# Patient Record
Sex: FEMALE | Race: WHITE | Employment: OTHER | URBAN - METROPOLITAN AREA
[De-identification: names, ages, dates, MRNs, and addresses within clinical notes are randomized per-mention and may not be internally consistent; named-entity substitution may affect disease eponyms.]

---

## 2017-08-22 ENCOUNTER — OFFICE VISIT (OUTPATIENT)
Dept: ENT CLINIC | Age: 77
End: 2017-08-22

## 2017-08-22 VITALS
BODY MASS INDEX: 27.55 KG/M2 | DIASTOLIC BLOOD PRESSURE: 70 MMHG | HEIGHT: 66 IN | WEIGHT: 171.4 LBS | SYSTOLIC BLOOD PRESSURE: 104 MMHG | HEART RATE: 66 BPM

## 2017-08-22 DIAGNOSIS — R13.12 OROPHARYNGEAL DYSPHAGIA: Primary | ICD-10-CM

## 2017-08-22 PROCEDURE — 1036F TOBACCO NON-USER: CPT | Performed by: OTOLARYNGOLOGY

## 2017-08-22 PROCEDURE — 99214 OFFICE O/P EST MOD 30 MIN: CPT | Performed by: OTOLARYNGOLOGY

## 2017-08-22 PROCEDURE — G8419 CALC BMI OUT NRM PARAM NOF/U: HCPCS | Performed by: OTOLARYNGOLOGY

## 2017-08-22 PROCEDURE — 1123F ACP DISCUSS/DSCN MKR DOCD: CPT | Performed by: OTOLARYNGOLOGY

## 2017-08-22 PROCEDURE — 4040F PNEUMOC VAC/ADMIN/RCVD: CPT | Performed by: OTOLARYNGOLOGY

## 2017-08-22 PROCEDURE — G8399 PT W/DXA RESULTS DOCUMENT: HCPCS | Performed by: OTOLARYNGOLOGY

## 2017-08-22 PROCEDURE — G8427 DOCREV CUR MEDS BY ELIG CLIN: HCPCS | Performed by: OTOLARYNGOLOGY

## 2017-08-22 PROCEDURE — 1090F PRES/ABSN URINE INCON ASSESS: CPT | Performed by: OTOLARYNGOLOGY

## 2017-10-13 ENCOUNTER — HOSPITAL ENCOUNTER (OUTPATIENT)
Dept: MAMMOGRAPHY | Age: 77
Discharge: HOME | End: 2017-10-13
Attending: OBSTETRICS & GYNECOLOGY | Admitting: OBSTETRICS & GYNECOLOGY

## 2017-10-13 DIAGNOSIS — Z12.31 VISIT FOR SCREENING MAMMOGRAM: ICD-10-CM

## 2017-10-13 DIAGNOSIS — R13.12 DYSPHAGIA, OROPHARYNGEAL PHASE: ICD-10-CM

## 2017-10-23 ENCOUNTER — HOSPITAL ENCOUNTER (OUTPATIENT)
Dept: SPEECH THERAPY | Age: 77
Discharge: HOME | End: 2017-10-23

## 2017-10-23 DIAGNOSIS — R13.12 OROPHARYNGEAL DYSPHAGIA: ICD-10-CM

## 2017-10-23 DIAGNOSIS — R13.12 DYSPHAGIA, OROPHARYNGEAL PHASE: ICD-10-CM

## 2017-10-23 NOTE — PROCEDURES
limits    Pharyngeal Phase  -Timely swallow initiation  -Effortful bolus transfer  -Delayed UES opening resulting in delayed pharyngeal clearing  -Consistent flash laryngeal penetration with thin liquids, this was self-clearing  -No aspiration was viewed with any texture    Esophageal Phase  Unremarkable    Following Evaluation:  Results/recommendations and education given to Patient who verbalized understanding    Timed Code Treatment: 0 minutes    Total Treatment Time: 40 minutes    Antoine Del Rosario MA, [id number] [facility]    Speech-Language Pathologist

## 2017-12-13 ENCOUNTER — OFFICE VISIT (OUTPATIENT)
Dept: ENT CLINIC | Age: 77
End: 2017-12-13

## 2017-12-13 VITALS
WEIGHT: 178.2 LBS | SYSTOLIC BLOOD PRESSURE: 117 MMHG | HEIGHT: 66 IN | HEART RATE: 68 BPM | DIASTOLIC BLOOD PRESSURE: 77 MMHG | BODY MASS INDEX: 28.64 KG/M2

## 2017-12-13 DIAGNOSIS — R13.12 OROPHARYNGEAL DYSPHAGIA: Primary | ICD-10-CM

## 2017-12-13 PROCEDURE — 31575 DIAGNOSTIC LARYNGOSCOPY: CPT | Performed by: OTOLARYNGOLOGY

## 2017-12-13 RX ORDER — ROSUVASTATIN CALCIUM 5 MG/1
5 TABLET, COATED ORAL
COMMUNITY

## 2017-12-13 NOTE — PROGRESS NOTES
Chief Complaint   Patient presents with    Dysphagia       PROCEDURE:  FLEXIBLE FIBEROPTIC NASOPHARYNGOLARYNGOSCOPY  INDICATION:  Inadequate visualization by indirect laryngoscopy mirror examination and need for detailed examination of the larynx and pharynx to evaluate dysphagia. INFORMED CONSENT:  The patient was advised of the medical necessity for this procedure, which was described. The attendant risks and potential complications were discussed, including, but not limited to bleeding, infection, adverse reaction to medications, hoarseness, sore throat, inability to obtain adequate visualization, and future need for rigid operative endoscopy. The expected outcome, potential benefits and the alternatives of therapy were discussed. David Chung asked appropriate questions and then expressed the lack of any further questions, understanding, acceptance, and the desire to undergo with this procedure, granting verbal informed consent. FINDINGS:  There was diffuse mucosal erythema and edema in the nasal cavity and nasopharynx consistent with viral URI. Otherwise there was a normal endoscopic appearance of the upper aerodigestive tract. The vocal cords appeared to be normal, with no nodule, ulceration, polyp, leukoplakia or other lesions. The vocal cords appeared to be normally mobile bilaterally with midline approximation on phonation. Sensation of the hypopharynx and larynx appeared to be normal when touched by the end of the flexible scope. The nasopharynx, eustachian tube orifices and fossa of Rosenmüller, oropharynx, base of tongue, hypopharynx, supraglottis, subglottis, and piriform sinuses all appeared to be normal, with no lesions. Visualization was excellent throughout the examination. I examined ears at hear request.  There was a cerumen accumulation in the right EAC. The visible portion of the left TM appeard to be normal.  The left EAC was clear.   The left TM appeared to be normal. (MBS)  Diet Prior to Study: Regular texture diet with thin liquids  Pain Level: Pt did not report pain     Impression:  Modified Barium Swallow evaluation completed on 10/23/2017. Results indicate mild oropharyngeal dysphagia characterized by effortful bolus transfer, delayed UES opening resulting in delayed pharyngeal clearing, and consistent flash laryngeal penetration with thin liquids. Thin liquids via cup and straw revealed timely swallow initiation with delayed UES opening resulting in consistent flash laryngeal penetration during the swallow. This appeared to be self-clearing with no stain noted on underside of epiglottis. No aspiration was viewed with any texture. Pt was noted to have effortful bolus transfer with physical effort observed when transitioning the bolus. Adequate mastication and oral clearance was observed with solid textures. At this time Pt is at mild risk for aspiration of thin liquids due to delayed pharyngeal clearing/UES opening, therefore precautions should be followed.     Aspiration/Penetration Risk:  Mild risk with thin liquids     Recommendations:    Diet Level: Regular texture diet with thin liquids, meds as tolerated  Referral: Consider GI consult due to Pt reported history of reflux  Strategies: Upright 90 degrees at meals; Small bites/sips, GERD precautions  Treatments: Speech Therapy for dysphagia treatment  Goals:  1)Pt will tolerate diet with no signs or symptoms of aspiration   2)Additional goals per treating SLP        IMPRESSION / Beny Mcguire / Panfilo Aguirre / Johnathan Quintanilla was seen today for dysphagia. Diagnoses and all orders for this visit:    Oropharyngeal dysphagia        RECOMMENDATIONS / PLAN    1. Dysphagia therapy. Patient declined, but will consider if her self help measures are not successful. 2. Follow speech therapy suggestion. Patient declined, but will consider if her self help measures are not successful. 3. Exercise GERD precautions.   Patient stated she knows all about these and will do them along with her self help measures. 4. She agreed to call if her symptoms get worse or if she has hearing loss or heaviness or fullness in her ears for possible ear cleaning. 5. Return for any further Ear, Nose, Throat, or Sinus problems.

## 2018-05-07 ENCOUNTER — OFFICE VISIT (OUTPATIENT)
Dept: ENT CLINIC | Age: 78
End: 2018-05-07

## 2018-05-07 VITALS — HEART RATE: 69 BPM | DIASTOLIC BLOOD PRESSURE: 64 MMHG | SYSTOLIC BLOOD PRESSURE: 96 MMHG

## 2018-05-07 DIAGNOSIS — H90.0 CONDUCTIVE HEARING LOSS OF BOTH EARS: ICD-10-CM

## 2018-05-07 DIAGNOSIS — H61.23 BILATERAL IMPACTED CERUMEN: Primary | ICD-10-CM

## 2018-05-07 PROCEDURE — 99999 PR OFFICE/OUTPT VISIT,PROCEDURE ONLY: CPT | Performed by: OTOLARYNGOLOGY

## 2018-05-07 PROCEDURE — 69210 REMOVE IMPACTED EAR WAX UNI: CPT | Performed by: OTOLARYNGOLOGY

## 2018-05-07 RX ORDER — M-VIT,TX,IRON,MINS/CALC/FOLIC 27MG-0.4MG
1 TABLET ORAL DAILY
COMMUNITY

## 2018-11-16 ENCOUNTER — HOSPITAL ENCOUNTER (OUTPATIENT)
Dept: WOMENS IMAGING | Age: 78
Discharge: HOME | End: 2018-11-16
Payer: MEDICARE

## 2018-11-16 DIAGNOSIS — Z80.3 FAMILY HISTORY OF BREAST CANCER IN FIRST DEGREE RELATIVE: ICD-10-CM

## 2018-11-16 DIAGNOSIS — Z12.31 ENCOUNTER FOR SCREENING MAMMOGRAM FOR BREAST CANCER: ICD-10-CM

## 2018-11-16 PROCEDURE — 77063 BREAST TOMOSYNTHESIS BI: CPT

## 2019-01-15 ENCOUNTER — OFFICE VISIT (OUTPATIENT)
Dept: ORTHOPEDIC SURGERY | Age: 79
End: 2019-01-15
Payer: MEDICARE

## 2019-01-15 VITALS
HEART RATE: 63 BPM | DIASTOLIC BLOOD PRESSURE: 87 MMHG | RESPIRATION RATE: 16 BRPM | BODY MASS INDEX: 28.28 KG/M2 | WEIGHT: 176 LBS | HEIGHT: 66 IN | SYSTOLIC BLOOD PRESSURE: 160 MMHG

## 2019-01-15 DIAGNOSIS — M25.571 RIGHT ANKLE PAIN, UNSPECIFIED CHRONICITY: ICD-10-CM

## 2019-01-15 DIAGNOSIS — S82.61XA CLOSED AVULSION FRACTURE OF LATERAL MALLEOLUS OF RIGHT FIBULA, INITIAL ENCOUNTER: Primary | ICD-10-CM

## 2019-01-15 PROCEDURE — G8419 CALC BMI OUT NRM PARAM NOF/U: HCPCS | Performed by: ORTHOPAEDIC SURGERY

## 2019-01-15 PROCEDURE — G8427 DOCREV CUR MEDS BY ELIG CLIN: HCPCS | Performed by: ORTHOPAEDIC SURGERY

## 2019-01-15 PROCEDURE — 99203 OFFICE O/P NEW LOW 30 MIN: CPT | Performed by: ORTHOPAEDIC SURGERY

## 2019-01-15 PROCEDURE — G8484 FLU IMMUNIZE NO ADMIN: HCPCS | Performed by: ORTHOPAEDIC SURGERY

## 2019-01-15 PROCEDURE — 1090F PRES/ABSN URINE INCON ASSESS: CPT | Performed by: ORTHOPAEDIC SURGERY

## 2019-01-15 PROCEDURE — 1101F PT FALLS ASSESS-DOCD LE1/YR: CPT | Performed by: ORTHOPAEDIC SURGERY

## 2019-01-16 PROBLEM — S82.61XA CLOSED AVULSION FRACTURE OF LATERAL MALLEOLUS OF RIGHT FIBULA: Status: ACTIVE | Noted: 2019-01-16

## 2019-01-17 ENCOUNTER — HOSPITAL ENCOUNTER (OUTPATIENT)
Dept: PHYSICAL THERAPY | Age: 79
Setting detail: THERAPIES SERIES
Discharge: HOME | End: 2019-01-17
Payer: MEDICARE

## 2019-01-17 PROCEDURE — 97110 THERAPEUTIC EXERCISES: CPT | Performed by: PHYSICAL THERAPIST

## 2019-01-17 PROCEDURE — 97161 PT EVAL LOW COMPLEX 20 MIN: CPT | Performed by: PHYSICAL THERAPIST

## 2019-01-17 PROCEDURE — 97140 MANUAL THERAPY 1/> REGIONS: CPT | Performed by: PHYSICAL THERAPIST

## 2019-01-17 ASSESSMENT — PAIN DESCRIPTION - LOCATION: LOCATION: ANKLE

## 2019-01-17 ASSESSMENT — PAIN DESCRIPTION - ONSET: ONSET: ON-GOING

## 2019-01-17 ASSESSMENT — PAIN DESCRIPTION - FREQUENCY: FREQUENCY: INTERMITTENT

## 2019-01-17 ASSESSMENT — PAIN DESCRIPTION - PAIN TYPE: TYPE: ACUTE PAIN

## 2019-01-17 ASSESSMENT — PAIN DESCRIPTION - DESCRIPTORS: DESCRIPTORS: ACHING

## 2019-01-17 ASSESSMENT — PAIN DESCRIPTION - PROGRESSION: CLINICAL_PROGRESSION: GRADUALLY IMPROVING

## 2019-01-17 ASSESSMENT — PAIN DESCRIPTION - ORIENTATION: ORIENTATION: RIGHT;OUTER

## 2019-01-17 ASSESSMENT — PAIN SCALES - GENERAL: PAINLEVEL_OUTOF10: 2

## 2019-01-22 ENCOUNTER — HOSPITAL ENCOUNTER (OUTPATIENT)
Dept: PHYSICAL THERAPY | Age: 79
Setting detail: THERAPIES SERIES
Discharge: HOME | End: 2019-01-22
Payer: MEDICARE

## 2019-01-22 PROCEDURE — 97110 THERAPEUTIC EXERCISES: CPT | Performed by: PHYSICAL THERAPIST

## 2019-01-22 PROCEDURE — 97140 MANUAL THERAPY 1/> REGIONS: CPT | Performed by: PHYSICAL THERAPIST

## 2019-01-25 ENCOUNTER — HOSPITAL ENCOUNTER (OUTPATIENT)
Dept: PHYSICAL THERAPY | Age: 79
Setting detail: THERAPIES SERIES
Discharge: HOME | End: 2019-01-25
Payer: MEDICARE

## 2019-01-25 PROCEDURE — 97110 THERAPEUTIC EXERCISES: CPT

## 2019-01-25 PROCEDURE — 97140 MANUAL THERAPY 1/> REGIONS: CPT

## 2019-01-29 ENCOUNTER — HOSPITAL ENCOUNTER (OUTPATIENT)
Dept: PHYSICAL THERAPY | Age: 79
Setting detail: THERAPIES SERIES
Discharge: HOME | End: 2019-01-29
Payer: MEDICARE

## 2019-01-29 PROCEDURE — 97140 MANUAL THERAPY 1/> REGIONS: CPT | Performed by: PHYSICAL THERAPIST

## 2019-01-29 PROCEDURE — 97110 THERAPEUTIC EXERCISES: CPT | Performed by: PHYSICAL THERAPIST

## 2019-01-31 ENCOUNTER — HOSPITAL ENCOUNTER (OUTPATIENT)
Dept: PHYSICAL THERAPY | Age: 79
Setting detail: THERAPIES SERIES
Discharge: HOME | End: 2019-01-31
Payer: MEDICARE

## 2019-01-31 PROCEDURE — 97110 THERAPEUTIC EXERCISES: CPT | Performed by: PHYSICAL THERAPIST

## 2019-01-31 PROCEDURE — 97140 MANUAL THERAPY 1/> REGIONS: CPT | Performed by: PHYSICAL THERAPIST

## 2019-01-31 PROCEDURE — 97112 NEUROMUSCULAR REEDUCATION: CPT | Performed by: PHYSICAL THERAPIST

## 2019-02-05 ENCOUNTER — HOSPITAL ENCOUNTER (OUTPATIENT)
Dept: PHYSICAL THERAPY | Age: 79
Setting detail: THERAPIES SERIES
Discharge: HOME | End: 2019-02-05
Payer: MEDICARE

## 2019-02-05 PROCEDURE — 97110 THERAPEUTIC EXERCISES: CPT | Performed by: PHYSICAL THERAPIST

## 2019-02-05 PROCEDURE — 97112 NEUROMUSCULAR REEDUCATION: CPT | Performed by: PHYSICAL THERAPIST

## 2019-02-05 PROCEDURE — 97140 MANUAL THERAPY 1/> REGIONS: CPT | Performed by: PHYSICAL THERAPIST

## 2019-02-05 PROCEDURE — 97035 APP MDLTY 1+ULTRASOUND EA 15: CPT | Performed by: PHYSICAL THERAPIST

## 2019-02-07 ENCOUNTER — APPOINTMENT (OUTPATIENT)
Dept: PHYSICAL THERAPY | Age: 79
End: 2019-02-07
Payer: MEDICARE

## 2019-02-12 ENCOUNTER — HOSPITAL ENCOUNTER (OUTPATIENT)
Dept: PHYSICAL THERAPY | Age: 79
Setting detail: THERAPIES SERIES
Discharge: HOME | End: 2019-02-12
Payer: MEDICARE

## 2019-02-12 PROCEDURE — 97112 NEUROMUSCULAR REEDUCATION: CPT | Performed by: PHYSICAL THERAPIST

## 2019-02-12 PROCEDURE — 97140 MANUAL THERAPY 1/> REGIONS: CPT | Performed by: PHYSICAL THERAPIST

## 2019-02-12 PROCEDURE — 97110 THERAPEUTIC EXERCISES: CPT | Performed by: PHYSICAL THERAPIST

## 2019-02-19 ENCOUNTER — APPOINTMENT (OUTPATIENT)
Dept: PHYSICAL THERAPY | Age: 79
End: 2019-02-19
Payer: MEDICARE

## 2019-02-25 ENCOUNTER — HOSPITAL ENCOUNTER (OUTPATIENT)
Dept: PHYSICAL THERAPY | Age: 79
Setting detail: THERAPIES SERIES
Discharge: HOME | End: 2019-02-25
Payer: MEDICARE

## 2019-02-25 PROCEDURE — 97140 MANUAL THERAPY 1/> REGIONS: CPT | Performed by: PHYSICAL THERAPIST

## 2019-02-25 PROCEDURE — 97110 THERAPEUTIC EXERCISES: CPT | Performed by: PHYSICAL THERAPIST

## 2019-02-26 ENCOUNTER — OFFICE VISIT (OUTPATIENT)
Dept: ORTHOPEDIC SURGERY | Age: 79
End: 2019-02-26
Payer: MEDICARE

## 2019-02-26 VITALS — RESPIRATION RATE: 16 BRPM | BODY MASS INDEX: 28.28 KG/M2 | WEIGHT: 176 LBS | HEIGHT: 66 IN

## 2019-02-26 DIAGNOSIS — M25.562 ACUTE PAIN OF LEFT KNEE: ICD-10-CM

## 2019-02-26 DIAGNOSIS — S82.61XA CLOSED AVULSION FRACTURE OF LATERAL MALLEOLUS OF RIGHT FIBULA, INITIAL ENCOUNTER: Primary | ICD-10-CM

## 2019-02-26 DIAGNOSIS — M17.12 PRIMARY OSTEOARTHRITIS OF LEFT KNEE: ICD-10-CM

## 2019-02-26 PROCEDURE — G8484 FLU IMMUNIZE NO ADMIN: HCPCS | Performed by: ORTHOPAEDIC SURGERY

## 2019-02-26 PROCEDURE — 4040F PNEUMOC VAC/ADMIN/RCVD: CPT | Performed by: ORTHOPAEDIC SURGERY

## 2019-02-26 PROCEDURE — G8399 PT W/DXA RESULTS DOCUMENT: HCPCS | Performed by: ORTHOPAEDIC SURGERY

## 2019-02-26 PROCEDURE — 1123F ACP DISCUSS/DSCN MKR DOCD: CPT | Performed by: ORTHOPAEDIC SURGERY

## 2019-02-26 PROCEDURE — 1090F PRES/ABSN URINE INCON ASSESS: CPT | Performed by: ORTHOPAEDIC SURGERY

## 2019-02-26 PROCEDURE — 1101F PT FALLS ASSESS-DOCD LE1/YR: CPT | Performed by: ORTHOPAEDIC SURGERY

## 2019-02-26 PROCEDURE — G8419 CALC BMI OUT NRM PARAM NOF/U: HCPCS | Performed by: ORTHOPAEDIC SURGERY

## 2019-02-26 PROCEDURE — G8427 DOCREV CUR MEDS BY ELIG CLIN: HCPCS | Performed by: ORTHOPAEDIC SURGERY

## 2019-02-26 PROCEDURE — 1036F TOBACCO NON-USER: CPT | Performed by: ORTHOPAEDIC SURGERY

## 2019-02-26 PROCEDURE — 99214 OFFICE O/P EST MOD 30 MIN: CPT | Performed by: ORTHOPAEDIC SURGERY

## 2019-03-05 ENCOUNTER — HOSPITAL ENCOUNTER (OUTPATIENT)
Dept: PHYSICAL THERAPY | Age: 79
Setting detail: THERAPIES SERIES
Discharge: HOME | End: 2019-03-05
Payer: MEDICARE

## 2019-03-05 PROCEDURE — 97110 THERAPEUTIC EXERCISES: CPT | Performed by: PHYSICAL THERAPIST

## 2019-03-05 PROCEDURE — 97140 MANUAL THERAPY 1/> REGIONS: CPT | Performed by: PHYSICAL THERAPIST

## 2019-03-05 PROCEDURE — 97161 PT EVAL LOW COMPLEX 20 MIN: CPT | Performed by: PHYSICAL THERAPIST

## 2019-03-05 PROCEDURE — 97035 APP MDLTY 1+ULTRASOUND EA 15: CPT | Performed by: PHYSICAL THERAPIST

## 2019-03-05 ASSESSMENT — PAIN SCALES - GENERAL: PAINLEVEL_OUTOF10: 2

## 2019-03-05 ASSESSMENT — PAIN DESCRIPTION - ORIENTATION: ORIENTATION: RIGHT

## 2019-03-05 ASSESSMENT — PAIN DESCRIPTION - PAIN TYPE: TYPE: CHRONIC PAIN

## 2019-03-05 ASSESSMENT — PAIN DESCRIPTION - LOCATION: LOCATION: HEAD;NECK

## 2019-03-12 ENCOUNTER — HOSPITAL ENCOUNTER (OUTPATIENT)
Dept: PHYSICAL THERAPY | Age: 79
Setting detail: THERAPIES SERIES
Discharge: HOME | End: 2019-03-12
Payer: MEDICARE

## 2019-03-12 PROCEDURE — 97110 THERAPEUTIC EXERCISES: CPT | Performed by: PHYSICAL THERAPIST

## 2019-03-12 PROCEDURE — 97035 APP MDLTY 1+ULTRASOUND EA 15: CPT | Performed by: PHYSICAL THERAPIST

## 2019-03-12 PROCEDURE — 97140 MANUAL THERAPY 1/> REGIONS: CPT | Performed by: PHYSICAL THERAPIST

## 2019-03-14 ENCOUNTER — APPOINTMENT (OUTPATIENT)
Dept: PHYSICAL THERAPY | Age: 79
End: 2019-03-14
Payer: MEDICARE

## 2019-03-19 ENCOUNTER — HOSPITAL ENCOUNTER (OUTPATIENT)
Dept: PHYSICAL THERAPY | Age: 79
Setting detail: THERAPIES SERIES
Discharge: HOME | End: 2019-03-19
Payer: MEDICARE

## 2019-03-19 PROCEDURE — 97140 MANUAL THERAPY 1/> REGIONS: CPT | Performed by: PHYSICAL THERAPIST

## 2019-03-19 PROCEDURE — 97110 THERAPEUTIC EXERCISES: CPT | Performed by: PHYSICAL THERAPIST

## 2019-03-19 PROCEDURE — 97035 APP MDLTY 1+ULTRASOUND EA 15: CPT | Performed by: PHYSICAL THERAPIST

## 2019-03-21 ENCOUNTER — APPOINTMENT (OUTPATIENT)
Dept: PHYSICAL THERAPY | Age: 79
End: 2019-03-21
Payer: MEDICARE

## 2019-03-26 ENCOUNTER — HOSPITAL ENCOUNTER (OUTPATIENT)
Dept: PHYSICAL THERAPY | Age: 79
Setting detail: THERAPIES SERIES
Discharge: HOME | End: 2019-03-26
Payer: MEDICARE

## 2019-03-26 PROCEDURE — 97110 THERAPEUTIC EXERCISES: CPT | Performed by: PHYSICAL THERAPIST

## 2019-03-26 PROCEDURE — 97140 MANUAL THERAPY 1/> REGIONS: CPT | Performed by: PHYSICAL THERAPIST

## 2019-03-26 PROCEDURE — 97035 APP MDLTY 1+ULTRASOUND EA 15: CPT | Performed by: PHYSICAL THERAPIST

## 2019-03-28 ENCOUNTER — APPOINTMENT (OUTPATIENT)
Dept: PHYSICAL THERAPY | Age: 79
End: 2019-03-28
Payer: MEDICARE

## 2019-04-09 ENCOUNTER — HOSPITAL ENCOUNTER (OUTPATIENT)
Dept: PHYSICAL THERAPY | Age: 79
Setting detail: THERAPIES SERIES
Discharge: HOME | End: 2019-04-09
Payer: MEDICARE

## 2019-04-09 PROCEDURE — 97110 THERAPEUTIC EXERCISES: CPT | Performed by: PHYSICAL THERAPIST

## 2019-04-09 PROCEDURE — 97035 APP MDLTY 1+ULTRASOUND EA 15: CPT | Performed by: PHYSICAL THERAPIST

## 2019-04-09 PROCEDURE — 97140 MANUAL THERAPY 1/> REGIONS: CPT | Performed by: PHYSICAL THERAPIST

## 2019-04-09 NOTE — FLOWSHEET NOTE
Physical Therapy Daily Treatment Note    Date:  2019    Patient Name:  Kory Pascal    :  1940  MRN: [id number]  Restrictions/Precautions:    Medical/Treatment Diagnosis Information:   · Diagnosis: Cervicalgia, Cx dystonia  · Treatment Diagnosis: cx dystonia, cervivalgia    Tracking Information:  Physician Information Referring Practitioner: Fede Wong MD     Plan of Care Sent Date: 3/5 Signed Received: 3/6   Visit Count / Total Visits  5/10    Insurance Approved Visits  /  Approved Dates:     Insurance Information PT Insurance Information: Medicare     Progress Note/G-codes   []  Yes  []  No Next Due:      Pain level: 0/10    History:   Patient reports onset of cx dystonia in . She was referred by her PCP for PT. CLOF: sleep disturbance is the main c/o, along with stiffness in her neck. She also reports decreased cx ROM. PLOF: painfree ADLs and sleep, normal AROM. Subjective: Patient reports stiffness in neck vs pain    Objective:  Observation: CPVM mm guarding  Test measurements:  See eval    Exercises:  Exercise/Equipment Resistance/Repetitions Other comments        UBE retro add              Ball on wall - cx Chin nod x 10  cx rotation x 10 L/R    pect str in doorway 2 x 30 sec         TB row  Issued for HEP   Cable:  Mid row  High row   10#  2 x 10  add                                Other Therapeutic Activities:  3/5: Patient educated on PT and plan of care including diagnosis, prognosis, treatment goals and options. Patient verbalized understanding. Educated on sleeping positions - try towel roll in pillowcase    Home Exercise Program:  3/5: Cx Str: UT, LS, SCM, chin tucks, MH. Patient demonstrated good understanding of written HEP. PT educated patient on frequency and intensity of exercises.   3/26: TB row - orange    Manual Treatments:  : STM in sitting and supine, manual cx traction and stretches, SOR, PA mobs of c-spine    Modalities:  : US @ 100% 1.5w/cm to B CPVM x 8min - pt seated,  to cx x 15 min - pt supine with wedge    Timed Code Treatment Minutes:  50    Total Treatment Minutes: 65    Treatment/Activity Tolerance:  [x] Patient tolerated treatment well [] Patient limited by fatigue  [] Patient limited by pain  [] Patient limited by other medical complications  [] Other:     Prognosis: [x] Good [] Fair  [] Poor    Patient Requires Follow-up: [x] Yes  [] No    Plan:   [x] Continue per plan of care [] Alter current plan (see comments)  [] Plan of care initiated [] Hold pending MD visit [] Discharge    Plan for Next Session:  Cont US/manual, cx stabs    Electronically signed by:  Christin Garland

## 2019-04-11 ENCOUNTER — HOSPITAL ENCOUNTER (OUTPATIENT)
Dept: PHYSICAL THERAPY | Age: 79
Setting detail: THERAPIES SERIES
Discharge: HOME | End: 2019-04-11
Payer: MEDICARE

## 2019-04-11 PROCEDURE — 97035 APP MDLTY 1+ULTRASOUND EA 15: CPT | Performed by: PHYSICAL THERAPIST

## 2019-04-11 PROCEDURE — 97140 MANUAL THERAPY 1/> REGIONS: CPT | Performed by: PHYSICAL THERAPIST

## 2019-04-11 PROCEDURE — 97110 THERAPEUTIC EXERCISES: CPT | Performed by: PHYSICAL THERAPIST

## 2019-04-16 ENCOUNTER — HOSPITAL ENCOUNTER (OUTPATIENT)
Dept: PHYSICAL THERAPY | Age: 79
Setting detail: THERAPIES SERIES
Discharge: HOME | End: 2019-04-16
Payer: MEDICARE

## 2019-04-16 PROCEDURE — 97035 APP MDLTY 1+ULTRASOUND EA 15: CPT | Performed by: PHYSICAL THERAPIST

## 2019-04-16 PROCEDURE — 97140 MANUAL THERAPY 1/> REGIONS: CPT | Performed by: PHYSICAL THERAPIST

## 2019-04-16 PROCEDURE — 97110 THERAPEUTIC EXERCISES: CPT | Performed by: PHYSICAL THERAPIST

## 2019-04-18 ENCOUNTER — HOSPITAL ENCOUNTER (OUTPATIENT)
Dept: PHYSICAL THERAPY | Age: 79
Setting detail: THERAPIES SERIES
Discharge: HOME | End: 2019-04-18
Payer: MEDICARE

## 2019-04-18 PROCEDURE — 97110 THERAPEUTIC EXERCISES: CPT | Performed by: PHYSICAL THERAPIST

## 2019-04-18 PROCEDURE — 97035 APP MDLTY 1+ULTRASOUND EA 15: CPT | Performed by: PHYSICAL THERAPIST

## 2019-04-18 PROCEDURE — 97140 MANUAL THERAPY 1/> REGIONS: CPT | Performed by: PHYSICAL THERAPIST

## 2019-04-23 ENCOUNTER — HOSPITAL ENCOUNTER (OUTPATIENT)
Dept: PHYSICAL THERAPY | Age: 79
Setting detail: THERAPIES SERIES
Discharge: HOME | End: 2019-04-23
Payer: MEDICARE

## 2019-04-23 PROCEDURE — 97110 THERAPEUTIC EXERCISES: CPT | Performed by: PHYSICAL THERAPIST

## 2019-04-23 PROCEDURE — 97140 MANUAL THERAPY 1/> REGIONS: CPT | Performed by: PHYSICAL THERAPIST

## 2019-04-23 PROCEDURE — 97035 APP MDLTY 1+ULTRASOUND EA 15: CPT | Performed by: PHYSICAL THERAPIST

## 2019-04-25 ENCOUNTER — HOSPITAL ENCOUNTER (OUTPATIENT)
Dept: PHYSICAL THERAPY | Age: 79
Setting detail: THERAPIES SERIES
Discharge: HOME | End: 2019-04-25
Payer: MEDICARE

## 2019-04-25 PROCEDURE — 97140 MANUAL THERAPY 1/> REGIONS: CPT | Performed by: PHYSICAL THERAPIST

## 2019-04-25 PROCEDURE — 97110 THERAPEUTIC EXERCISES: CPT | Performed by: PHYSICAL THERAPIST

## 2019-04-25 PROCEDURE — 97035 APP MDLTY 1+ULTRASOUND EA 15: CPT | Performed by: PHYSICAL THERAPIST

## 2019-04-25 NOTE — PROGRESS NOTES
Outpatient Physical Therapy   Phone: [phone] Fax: [phone]    Physical Therapy Progress Note  Date: 2019        Patient Name:  Frederic Fajardo    :  1940  MRN: [id number]  Restrictions/Precautions:    Medical/Treatment Diagnosis Information:   · Diagnosis: Cervicalgia, Cx dystonia  · Treatment Diagnosis: cx dystonia, cervivalgia     Tracking Information:       Physician Information Referring Practitioner: Dipak Robin MD     Plan of Care Sent Date: 3/5 Signed Received: 3/6   Visit Count / Total Visits  10/10     Insurance Approved Visits  /  Approved Dates:     Insurance Information PT Insurance Information: Medicare     Progress Note/G-codes   []  Yes                 []  No Next Due:       Pain level:      -2/10    Time Period for Report:  3/5 - 19  Cancels/No-shows to date:  0    Plan of Care/Treatment to date:  [x] Therapeutic Exercise    [] Modalities:  [x] Therapeutic Activity     [x] Ultrasound  [] Electrical Stimulation  [] Gait Training      [] Cervical Traction    [] Lumbar Traction  [x] Neuromuscular Re-education  [x] Cold/hotpack [] Iontophoresis  [x] Instruction in HEP      Other:  [x] Manual Therapy       []    [] Aquatic Therapy       []                    ? Significant Findings At Last Visit/Comments:      Subjective:  Patient is progressing well, no pain, only stiffness. She notes decreased mm guarding     Objective:  · Observation: CPVM mm guarding is reducing but still present  · Test measurements:  Cx AROM:  40 flexion, 45 extension, SB L 30  R 40, Rotation L 72  R 66 degrees                                            UE strength: WNLs B      Assessment:  Summary: Patient is making steady progress with increased AROM and decreased mm guarding. She overall feels better and gets relief after a session. Plan to continue at this time in order to meet goals.   Patient's response to treatment: motivated, active, pleasant to work with    Progress towards goals: PROGRESSING TOWARDS  Long term goal 1: Patient will report 75% improvement in cx pain with sleep -GOALl MET  Long term goal 2: Patient will increase Cx AROM to WNLs all planes for ease with head turns - PROGRESSING  Long term goal 3: Patient will have minimal mm guarding in CPVM B for improved posture of head and shoulders - PROGRESSING  Long term goal 4: Patient will demonstrate increased scapular strength to good for improved postural control - IMPROVING  Patient Goals   Patient goals : increased cx AROM and stretching of cx muscles    Current Frequency/Duration:  # Days per week: [] 1 day # Weeks: [] 1 week [x] 4 weeks      [x] 2 days? [] 2 weeks [] 5 weeks      [] 3 days   [] 3 weeks [] 6 weeks     Rehab Potential: [] Excellent [x] Good [] Fair  [] Poor     Goal Status:  [] Achieved [] Partially Achieved  [] Not Achieved     Patient Status: [x] Continue per initial plan of Care     [] Patient now discharged     [x] Additional visits requested, Please re-certify for additional visits:      Requested frequency/duration: 2 X/week for  4 weeks    Electronically signed by: DILLAN Peters  If you have any questions or concerns, please don't hesitate to call.   Thank you for your referral.    Physician Signature:________________________________Date:__________________  By signing above, therapists plan is approved by physician

## 2019-04-30 ENCOUNTER — HOSPITAL ENCOUNTER (OUTPATIENT)
Dept: PHYSICAL THERAPY | Age: 79
Setting detail: THERAPIES SERIES
Discharge: HOME | End: 2019-04-30
Payer: MEDICARE

## 2019-04-30 PROCEDURE — 97110 THERAPEUTIC EXERCISES: CPT | Performed by: PHYSICAL THERAPIST

## 2019-04-30 PROCEDURE — 97035 APP MDLTY 1+ULTRASOUND EA 15: CPT | Performed by: PHYSICAL THERAPIST

## 2019-04-30 PROCEDURE — 97140 MANUAL THERAPY 1/> REGIONS: CPT | Performed by: PHYSICAL THERAPIST

## 2019-04-30 NOTE — FLOWSHEET NOTE
Physical Therapy Daily Treatment Note    Date:  2019    Patient Name:  Raúl Cramer    :  1940  MRN: [id number]  Restrictions/Precautions:    Medical/Treatment Diagnosis Information:   · Diagnosis: Cervicalgia, Cx dystonia  · Treatment Diagnosis: cx dystonia, cervivalgia    Tracking Information:  Physician Information Referring Practitioner: Vinay Arias MD     Plan of Care Sent Date: 3/5 Signed Received: 3/6   Visit Count / Total Visits      Insurance Approved Visits  /  Approved Dates:     Insurance Information PT Insurance Information: Medicare     Progress Note/G-codes   []  Yes  []  No Next Due:      Pain level: -2/10    History:   Patient reports onset of cx dystonia in . She was referred by her PCP for PT. CLOF: sleep disturbance is the main c/o, along with stiffness in her neck. She also reports decreased cx ROM. PLOF: painfree ADLs and sleep, normal AROM. Subjective: Patient reports only stiffness. Objective:  Observation: :CPVM mm guarding is reducing but still present  Test measurements: : Cx AROM:  40 flexion, 45 extension, SB L 30  R 40, Rotation L 72  R 66 degrees                                                    UE strength: WNLs B     Exercises:  Exercise/Equipment Resistance/Repetitions Other comments        UBE retro 2min              Ball on wall - cx Chin nod x 10  cx rotation x 10 L/R    pect str in doorway 2 x 30 sec         TB row  Issued for HEP   Cable:  Mid row  High row   15#  2 x 10  15# x 20                                Other Therapeutic Activities:  3/5: Patient educated on PT and plan of care including diagnosis, prognosis, treatment goals and options. Patient verbalized understanding. Educated on sleeping positions - try towel roll in pillowcase    Home Exercise Program:  3/5: Cx Str: UT, LS, SCM, chin tucks, MH. Patient demonstrated good understanding of written HEP.  PT educated patient on frequency and intensity of

## 2019-05-02 ENCOUNTER — HOSPITAL ENCOUNTER (OUTPATIENT)
Dept: PHYSICAL THERAPY | Age: 79
Setting detail: THERAPIES SERIES
Discharge: HOME | End: 2019-05-02
Payer: MEDICARE

## 2019-05-02 PROCEDURE — 97110 THERAPEUTIC EXERCISES: CPT | Performed by: PHYSICAL THERAPIST

## 2019-05-02 PROCEDURE — 97035 APP MDLTY 1+ULTRASOUND EA 15: CPT | Performed by: PHYSICAL THERAPIST

## 2019-05-02 PROCEDURE — 97140 MANUAL THERAPY 1/> REGIONS: CPT | Performed by: PHYSICAL THERAPIST

## 2019-05-07 ENCOUNTER — HOSPITAL ENCOUNTER (OUTPATIENT)
Dept: PHYSICAL THERAPY | Age: 79
Setting detail: THERAPIES SERIES
Discharge: HOME | End: 2019-05-07
Payer: MEDICARE

## 2019-05-07 PROCEDURE — 97140 MANUAL THERAPY 1/> REGIONS: CPT | Performed by: PHYSICAL THERAPIST

## 2019-05-07 PROCEDURE — 97110 THERAPEUTIC EXERCISES: CPT | Performed by: PHYSICAL THERAPIST

## 2019-05-07 PROCEDURE — 97035 APP MDLTY 1+ULTRASOUND EA 15: CPT | Performed by: PHYSICAL THERAPIST

## 2019-05-07 NOTE — FLOWSHEET NOTE
Physical Therapy Daily Treatment Note    Date:  2019    Patient Name:  Akhil Medina    :  1940  MRN: [id number]  Restrictions/Precautions:    Medical/Treatment Diagnosis Information:   · Diagnosis: Cervicalgia, Cx dystonia  · Treatment Diagnosis: cx dystonia, cervivalgia    Tracking Information:  Physician Information Referring Practitioner: Jazmine Joseph MD     Plan of Care Sent Date: 3/5 Signed Received: 3/6,    Visit Count / Total Visits      Insurance Approved Visits  /  Approved Dates:     Insurance Information PT Insurance Information: Medicare     Progress Note/G-codes   []  Yes  []  No Next Due:      Pain level: -2/10    History:   Patient reports onset of cx dystonia in . She was referred by her PCP for PT. CLOF: sleep disturbance is the main c/o, along with stiffness in her neck. She also reports decreased cx ROM. PLOF: painfree ADLs and sleep, normal AROM. Subjective: Patient having more popping in cx region today    Objective:  Observation: :CPVM mm guarding is reducing but still present  Test measurements: : Cx AROM:  40 flexion, 45 extension, SB L 30  R 40, Rotation L 72  R 66 degrees                                                    UE strength: WNLs B     Exercises:  Exercise/Equipment Resistance/Repetitions Other comments        UBE retro 2min              Ball on wall - cx Chin nod x 10  cx rotation x 10 L/R    pect str in doorway 2 x 30 sec         TB row  Issued for HEP   Cable:  Mid row  High row   15#  2 x 10  15# x 20                                Other Therapeutic Activities:  3/5: Patient educated on PT and plan of care including diagnosis, prognosis, treatment goals and options. Patient verbalized understanding. Educated on sleeping positions - try towel roll in pillowcase    Home Exercise Program:  3/5: Cx Str: UT, LS, SCM, chin tucks, MH. Patient demonstrated good understanding of written HEP.  PT educated patient on frequency and intensity of exercises.   3/26: TB row - orange    Manual Treatments:  5/7: STM in sitting and supine, manual cx traction and stretches, SOR, PA mobs of c-spine    Modalities:  5/7: US @ 100% 1.5w/cm to B CPVM x 8min - pt seated, MH to cx x 15 min - pt supine with wedge    Timed Code Treatment Minutes:  50     Total Treatment Minutes: 65    Treatment/Activity Tolerance:  [x] Patient tolerated treatment well [] Patient limited by fatigue  [] Patient limited by pain  [] Patient limited by other medical complications  [] Other:     Prognosis: [x] Good [] Fair  [] Poor    Patient Requires Follow-up: [x] Yes  [] No    Plan:   [x] Continue per plan of care [] Alter current plan (see comments)  [] Plan of care initiated [] Hold pending MD visit [] Discharge    Plan for Next Session:  Cont US/manual, cx stabs    Electronically signed by:  Patric Durham

## 2019-05-09 ENCOUNTER — HOSPITAL ENCOUNTER (OUTPATIENT)
Dept: PHYSICAL THERAPY | Age: 79
Setting detail: THERAPIES SERIES
Discharge: HOME | End: 2019-05-09
Payer: MEDICARE

## 2019-05-09 PROCEDURE — 97035 APP MDLTY 1+ULTRASOUND EA 15: CPT | Performed by: PHYSICAL THERAPIST

## 2019-05-09 PROCEDURE — 97140 MANUAL THERAPY 1/> REGIONS: CPT | Performed by: PHYSICAL THERAPIST

## 2019-05-09 PROCEDURE — 97110 THERAPEUTIC EXERCISES: CPT | Performed by: PHYSICAL THERAPIST

## 2019-05-09 NOTE — FLOWSHEET NOTE
Physical Therapy Daily Treatment Note    Date:  2019    Patient Name:  Valentine Huntre    :  1940  MRN: [id number]  Restrictions/Precautions:    Medical/Treatment Diagnosis Information:   · Diagnosis: Cervicalgia, Cx dystonia  · Treatment Diagnosis: cx dystonia, cervivalgia    Tracking Information:  Physician Information Referring Practitioner: Regino Villa MD     Plan of Care Sent Date: 3/5 Signed Received: 3/6,    Visit Count / Total Visits      Insurance Approved Visits  /  Approved Dates:     Insurance Information PT Insurance Information: Medicare     Progress Note/G-codes   []  Yes  []  No Next Due:      Pain level: -2/10    History:   Patient reports onset of cx dystonia in . She was referred by her PCP for PT. CLOF: sleep disturbance is the main c/o, along with stiffness in her neck. She also reports decreased cx ROM. PLOF: painfree ADLs and sleep, normal AROM. Subjective: Patient having more popping in cx region today    Objective:  Observation: :CPVM mm guarding is reducing but still present  Test measurements: : Cx AROM:  40 flexion, 45 extension, SB L 30  R 40, Rotation L 72  R 66 degrees                                                    UE strength: WNLs B     Exercises:  Exercise/Equipment Resistance/Repetitions Other comments        UBE retro 2min              Ball on wall - cx Chin nod x 10  cx rotation x 10 L/R    pect str in doorway 2 x 30 sec         TB row  Issued for HEP   Cable:  Mid row  High row   15#  2 x 10  15# x 20                                Other Therapeutic Activities:  3/5: Patient educated on PT and plan of care including diagnosis, prognosis, treatment goals and options. Patient verbalized understanding. Educated on sleeping positions - try towel roll in pillowcase    Home Exercise Program:  3/5: Cx Str: UT, LS, SCM, chin tucks, MH. Patient demonstrated good understanding of written HEP.  PT educated patient on frequency and intensity of exercises.   3/26: TB row - orange    Manual Treatments:  5/9: STM in sitting and supine, manual cx traction and stretches, SOR, PA mobs of c-spine    Modalities:  5/9: US @ 100% 1.5w/cm to B CPVM x 8min - pt seated, MH to cx x 15 min - pt supine with wedge    Timed Code Treatment Minutes:  50     Total Treatment Minutes: 65    Treatment/Activity Tolerance:  [x] Patient tolerated treatment well [] Patient limited by fatigue  [] Patient limited by pain  [] Patient limited by other medical complications  [] Other:     Prognosis: [x] Good [] Fair  [] Poor    Patient Requires Follow-up: [x] Yes  [] No    Plan:   [x] Continue per plan of care [] Alter current plan (see comments)  [] Plan of care initiated [] Hold pending MD visit [] Discharge    Plan for Next Session:  Cont US/manual, cx stabs    Electronically signed by:  Kori Bustillos

## 2019-05-14 ENCOUNTER — HOSPITAL ENCOUNTER (OUTPATIENT)
Dept: PHYSICAL THERAPY | Age: 79
Setting detail: THERAPIES SERIES
Discharge: HOME | End: 2019-05-14
Payer: MEDICARE

## 2019-05-14 PROCEDURE — 97035 APP MDLTY 1+ULTRASOUND EA 15: CPT | Performed by: PHYSICAL THERAPIST

## 2019-05-14 PROCEDURE — 97140 MANUAL THERAPY 1/> REGIONS: CPT | Performed by: PHYSICAL THERAPIST

## 2019-05-14 PROCEDURE — 97110 THERAPEUTIC EXERCISES: CPT | Performed by: PHYSICAL THERAPIST

## 2019-05-14 NOTE — FLOWSHEET NOTE
Physical Therapy Daily Treatment Note    Date:  2019    Patient Name:  Dayne Cruz    :  1940  MRN: [id number]  Restrictions/Precautions:    Medical/Treatment Diagnosis Information:   · Diagnosis: Cervicalgia, Cx dystonia  · Treatment Diagnosis: cx dystonia, cervivalgia    Tracking Information:  Physician Information Referring Practitioner: Jeb Mcgee MD     Plan of Care Sent Date: 3/5 Signed Received: 3/6,    Visit Count / Total Visits      Insurance Approved Visits  /  Approved Dates:     Insurance Information PT Insurance Information: Medicare     Progress Note/G-codes   []  Yes  []  No Next Due:      Pain level: -2/10    History:   Patient reports onset of cx dystonia in . She was referred by her PCP for PT. CLOF: sleep disturbance is the main c/o, along with stiffness in her neck. She also reports decreased cx ROM. PLOF: painfree ADLs and sleep, normal AROM. Subjective: Patient is tired and sore from working in her yard. Objective:  Observation: :CPVM mm guarding is reducing but still present  Test measurements: : Cx AROM:  40 flexion, 45 extension, SB L 30  R 40, Rotation L 72  R 66 degrees                                                    UE strength: WNLs B     Exercises:  Exercise/Equipment Resistance/Repetitions Other comments        UBE retro 2min              Ball on wall - cx Chin nod x 10  cx rotation x 10 L/R    pect str in doorway 2 x 30 sec         TB row  Issued for HEP   Cable:  Mid row  High row   15#  2 x 10  15# x 20                                Other Therapeutic Activities:  3/5: Patient educated on PT and plan of care including diagnosis, prognosis, treatment goals and options. Patient verbalized understanding. Educated on sleeping positions - try towel roll in pillowcase    Home Exercise Program:  3/5: Cx Str: UT, LS, SCM, chin tucks, MH. Patient demonstrated good understanding of written HEP.  PT educated patient on frequency and intensity of exercises.   3/26: TB row - orange    Manual Treatments:  5/14: STM in sitting and supine, manual cx traction and stretches, SOR, PA mobs of c-spine    Modalities:  5/14: US @ 100% 1.5w/cm to B CPVM x 8min - pt seated, MH to cx x 15 min - pt supine with wedge    Timed Code Treatment Minutes:  50     Total Treatment Minutes: 65    Treatment/Activity Tolerance:  [x] Patient tolerated treatment well [] Patient limited by fatigue  [] Patient limited by pain  [] Patient limited by other medical complications  [] Other:     Prognosis: [x] Good [] Fair  [] Poor    Patient Requires Follow-up: [x] Yes  [] No    Plan:   [x] Continue per plan of care [] Alter current plan (see comments)  [] Plan of care initiated [] Hold pending MD visit [] Discharge    Plan for Next Session:  Cont US/manual, cx stabs    Electronically signed by:  Obinna Maharaj

## 2019-05-16 ENCOUNTER — HOSPITAL ENCOUNTER (OUTPATIENT)
Dept: PHYSICAL THERAPY | Age: 79
Setting detail: THERAPIES SERIES
Discharge: HOME | End: 2019-05-16
Payer: MEDICARE

## 2019-05-16 PROCEDURE — 97035 APP MDLTY 1+ULTRASOUND EA 15: CPT | Performed by: PHYSICAL THERAPIST

## 2019-05-16 PROCEDURE — 97140 MANUAL THERAPY 1/> REGIONS: CPT | Performed by: PHYSICAL THERAPIST

## 2019-05-16 PROCEDURE — 97110 THERAPEUTIC EXERCISES: CPT | Performed by: PHYSICAL THERAPIST

## 2019-05-16 NOTE — FLOWSHEET NOTE
Physical Therapy Daily Treatment Note    Date:  2019    Patient Name:  Raúl Cramer    :  1940  MRN: [id number]  Restrictions/Precautions:    Medical/Treatment Diagnosis Information:   · Diagnosis: Cervicalgia, Cx dystonia  · Treatment Diagnosis: cx dystonia, cervivalgia    Tracking Information:  Physician Information Referring Practitioner: Vinay Arias MD     Plan of Care Sent Date: 3/5 Signed Received: 3/6,    Visit Count / Total Visits  15/20    Insurance Approved Visits  /  Approved Dates:     Insurance Information PT Insurance Information: Medicare     Progress Note/G-codes   []  Yes  []  No Next Due:      Pain level: -2/10     History:   Patient reports onset of cx dystonia in . She was referred by her PCP for PT. CLOF: sleep disturbance is the main c/o, along with stiffness in her neck. She also reports decreased cx ROM. PLOF: painfree ADLs and sleep, normal AROM. Subjective: L UT tightness form stressed getting ready for a party this weekend. Objective:  Observation: :CPVM mm guarding is reducing but still present  Test measurements: : Cx AROM:  40 flexion, 45 extension, SB L 30  R 40, Rotation L 72  R 66 degrees                                                    UE strength: WNLs B     Exercises:  Exercise/Equipment Resistance/Repetitions Other comments        UBE retro 2min              Ball on wall - cx Chin nod x 10  cx rotation x 10 L/R    pect str in doorway 2 x 30 sec         TB row  Issued for HEP   Cable:  Mid row  High row   15#  2 x 10  15# x 20                                Other Therapeutic Activities:  3/5: Patient educated on PT and plan of care including diagnosis, prognosis, treatment goals and options. Patient verbalized understanding. Educated on sleeping positions - try towel roll in pillowcase    Home Exercise Program:  3/5: Cx Str: UT, LS, SCM, chin tucks, MH. Patient demonstrated good understanding of written HEP.  PT educated patient on frequency and intensity of exercises.   3/26: TB row - orange    Manual Treatments:  5/16: STM in sitting and supine, manual cx traction and stretches, SOR, PA mobs of c-spine    Modalities:  5/16: US @ 100% 1.5w/cm to B CPVM x 8min - pt seated, MH to cx x 15 min - pt supine with wedge    Timed Code Treatment Minutes:  50     Total Treatment Minutes: 65    Treatment/Activity Tolerance:  [x] Patient tolerated treatment well [] Patient limited by fatigue  [] Patient limited by pain  [] Patient limited by other medical complications  [] Other:     Prognosis: [x] Good [] Fair  [] Poor    Patient Requires Follow-up: [x] Yes  [] No    Plan:   [x] Continue per plan of care [] Alter current plan (see comments)  [] Plan of care initiated [] Hold pending MD visit [] Discharge    Plan for Next Session:  Cont US/manual, cx stabs    Electronically signed by:  Gary Momin

## 2019-05-21 ENCOUNTER — HOSPITAL ENCOUNTER (OUTPATIENT)
Dept: PHYSICAL THERAPY | Age: 79
Setting detail: THERAPIES SERIES
Discharge: HOME | End: 2019-05-21
Payer: MEDICARE

## 2019-05-21 PROCEDURE — 97140 MANUAL THERAPY 1/> REGIONS: CPT | Performed by: PHYSICAL THERAPIST

## 2019-05-21 PROCEDURE — 97035 APP MDLTY 1+ULTRASOUND EA 15: CPT | Performed by: PHYSICAL THERAPIST

## 2019-05-21 PROCEDURE — 97110 THERAPEUTIC EXERCISES: CPT | Performed by: PHYSICAL THERAPIST

## 2019-05-21 NOTE — FLOWSHEET NOTE
exercises.   3/26: TB row - orange    Manual Treatments:  5/21: STM in sitting and supine, manual cx traction and stretches, SOR, PA mobs of c-spine    Modalities:  5/21: US @ 100% 1.5w/cm to B CPVM x 8min - pt seated, MH to cx x 15 min - pt supine with wedge    Timed Code Treatment Minutes:  50     Total Treatment Minutes: 65    Treatment/Activity Tolerance:  [x] Patient tolerated treatment well [] Patient limited by fatigue  [] Patient limited by pain  [] Patient limited by other medical complications  [] Other:     Prognosis: [x] Good [] Fair  [] Poor    Patient Requires Follow-up: [x] Yes  [] No    Plan:   [x] Continue per plan of care [] Alter current plan (see comments)  [] Plan of care initiated [] Hold pending MD visit [] Discharge    Plan for Next Session:  Cont US/manual, cx stabs    Electronically signed by:  Kori Bustillos

## 2019-06-03 ENCOUNTER — HOSPITAL ENCOUNTER (OUTPATIENT)
Dept: GENERAL RADIOLOGY | Age: 79
Discharge: HOME | End: 2019-06-03
Payer: MEDICARE

## 2019-06-03 DIAGNOSIS — M81.0 OSTEOPOROSIS WITHOUT CURRENT PATHOLOGICAL FRACTURE, UNSPECIFIED OSTEOPOROSIS TYPE: ICD-10-CM

## 2019-06-03 PROCEDURE — 77080 DXA BONE DENSITY AXIAL: CPT

## 2019-06-18 ENCOUNTER — HOSPITAL ENCOUNTER (OUTPATIENT)
Dept: PHYSICAL THERAPY | Age: 79
Setting detail: THERAPIES SERIES
Discharge: HOME | End: 2019-06-18
Payer: MEDICARE

## 2019-06-18 PROCEDURE — 97140 MANUAL THERAPY 1/> REGIONS: CPT | Performed by: PHYSICAL THERAPIST

## 2019-06-18 PROCEDURE — 97110 THERAPEUTIC EXERCISES: CPT | Performed by: PHYSICAL THERAPIST

## 2019-06-18 PROCEDURE — 97035 APP MDLTY 1+ULTRASOUND EA 15: CPT | Performed by: PHYSICAL THERAPIST

## 2019-06-18 NOTE — FLOWSHEET NOTE
Physical Therapy Daily Treatment Note    Date:  2019    Patient Name:  Joshua Engel    :  1940  MRN: [id number]  Restrictions/Precautions:    Medical/Treatment Diagnosis Information:   · Diagnosis: Cervicalgia, Cx dystonia  · Treatment Diagnosis: cx dystonia, cervivalgia    Tracking Information:  Physician Information Referring Practitioner: Kashmir Newberry MD     Plan of Care Sent Date: 3/5 Signed Received: 3/6,    Visit Count / Total Visits      Insurance Approved Visits  /  Approved Dates:     Insurance Information PT Insurance Information: Medicare     Progress Note/G-codes   []  Yes  []  No Next Due:      Pain level: 1/10      History:   Patient reports onset of cx dystonia in . She was referred by her PCP for PT. CLOF: sleep disturbance is the main c/o, along with stiffness in her neck. She also reports decreased cx ROM. PLOF: painfree ADLs and sleep, normal AROM. Subjective: Patient has missed PT in last few weeks. She is considering botox since continues to have sleep disturbance and mm guarding. Objective:  Observation: :CPVM mm guarding is reducing but still present  Test measurements: : Cx AROM:  40 flexion, 45 extension, SB L 30  R 40, Rotation L 72  R 66 degrees                                                    UE strength: WNLs B     Exercises:  Exercise/Equipment Resistance/Repetitions Other comments        UBE retro 2min              Ball on wall - cx Chin nod x 10  cx rotation x 10 L/R    pect str in doorway 2 x 30 sec         TB row  Issued for HEP   Cable:  Mid row  High row   15#  2 x 10  15# x 20                                Other Therapeutic Activities:  3/5: Patient educated on PT and plan of care including diagnosis, prognosis, treatment goals and options. Patient verbalized understanding. Educated on sleeping positions - try towel roll in pillowcase    Home Exercise Program:  3/5: Cx Str: UT, LS, SCM, chin tucks, MH.  Patient demonstrated

## 2019-06-20 ENCOUNTER — OFFICE VISIT (OUTPATIENT)
Dept: ORTHOPEDIC SURGERY | Age: 79
End: 2019-06-20
Payer: MEDICARE

## 2019-06-20 ENCOUNTER — HOSPITAL ENCOUNTER (OUTPATIENT)
Dept: PHYSICAL THERAPY | Age: 79
Setting detail: THERAPIES SERIES
Discharge: HOME | End: 2019-06-20
Payer: MEDICARE

## 2019-06-20 VITALS
WEIGHT: 178 LBS | BODY MASS INDEX: 29.66 KG/M2 | SYSTOLIC BLOOD PRESSURE: 111 MMHG | HEART RATE: 67 BPM | HEIGHT: 65 IN | RESPIRATION RATE: 16 BRPM | DIASTOLIC BLOOD PRESSURE: 74 MMHG

## 2019-06-20 DIAGNOSIS — S80.02XA CONTUSION OF LEFT KNEE, INITIAL ENCOUNTER: ICD-10-CM

## 2019-06-20 DIAGNOSIS — M25.562 ACUTE PAIN OF LEFT KNEE: Primary | ICD-10-CM

## 2019-06-20 DIAGNOSIS — M17.12 PRIMARY OSTEOARTHRITIS OF LEFT KNEE: ICD-10-CM

## 2019-06-20 PROCEDURE — G8399 PT W/DXA RESULTS DOCUMENT: HCPCS | Performed by: ORTHOPAEDIC SURGERY

## 2019-06-20 PROCEDURE — G8419 CALC BMI OUT NRM PARAM NOF/U: HCPCS | Performed by: ORTHOPAEDIC SURGERY

## 2019-06-20 PROCEDURE — 97035 APP MDLTY 1+ULTRASOUND EA 15: CPT | Performed by: PHYSICAL THERAPIST

## 2019-06-20 PROCEDURE — 1036F TOBACCO NON-USER: CPT | Performed by: ORTHOPAEDIC SURGERY

## 2019-06-20 PROCEDURE — 4040F PNEUMOC VAC/ADMIN/RCVD: CPT | Performed by: ORTHOPAEDIC SURGERY

## 2019-06-20 PROCEDURE — 97140 MANUAL THERAPY 1/> REGIONS: CPT | Performed by: PHYSICAL THERAPIST

## 2019-06-20 PROCEDURE — 1090F PRES/ABSN URINE INCON ASSESS: CPT | Performed by: ORTHOPAEDIC SURGERY

## 2019-06-20 PROCEDURE — 1123F ACP DISCUSS/DSCN MKR DOCD: CPT | Performed by: ORTHOPAEDIC SURGERY

## 2019-06-20 PROCEDURE — 99214 OFFICE O/P EST MOD 30 MIN: CPT | Performed by: ORTHOPAEDIC SURGERY

## 2019-06-20 PROCEDURE — G8427 DOCREV CUR MEDS BY ELIG CLIN: HCPCS | Performed by: ORTHOPAEDIC SURGERY

## 2019-06-20 NOTE — FLOWSHEET NOTE
Physical Therapy Daily Treatment Note    Date:  2019    Patient Name:  Valentine Hunter    :  1940  MRN: [id number]  Restrictions/Precautions:    Medical/Treatment Diagnosis Information:   · Diagnosis: Cervicalgia, Cx dystonia  · Treatment Diagnosis: cx dystonia, cervivalgia    Tracking Information:  Physician Information Referring Practitioner: Regino Villa MD     Plan of Care Sent Date: 3/5 Signed Received: 3/6,    Visit Count / Total Visits      Insurance Approved Visits  /  Approved Dates:     Insurance Information PT Insurance Information: Medicare     Progress Note/G-codes   []  Yes  []  No Next Due:      Pain level: 1/10      History:   Patient reports onset of cx dystonia in . She was referred by her PCP for PT. CLOF: sleep disturbance is the main c/o, along with stiffness in her neck. She also reports decreased cx ROM. PLOF: painfree ADLs and sleep, normal AROM. Subjective: Patient on a time constraint today. Overall felt better after last session. Cont to have sleep disturbance d/t involuntary mm movement       Objective:  Observation: :CPVM mm guarding is reducing but still present  Test measurements: : Cx AROM:  40 flexion, 45 extension, SB L 30  R 40, Rotation L 72  R 66 degrees                                                    UE strength: WNLs B     Exercises:  Exercise/Equipment Resistance/Repetitions Other comments        UBE retro            Ball on wall - cx    pect str in doorway        TB row Issued for HEP   Cable:  Mid row  High row                                Other Therapeutic Activities:  3/5: Patient educated on PT and plan of care including diagnosis, prognosis, treatment goals and options. Patient verbalized understanding. Educated on sleeping positions - try towel roll in pillowcase    Home Exercise Program:  3/5: Cx Str: UT, LS, SCM, chin tucks, MH. Patient demonstrated good understanding of written HEP.  PT educated patient on frequency and intensity of exercises.   3/26: TB row - orange    Manual Treatments:  6/20: STM in sitting and supine, manual cx traction and stretches, SOR, PA mobs of c-spine    Modalities:  6/20: US @ 100% 1.5w/cm to B CPVM x 8min - pt seated,     Timed Code Treatment Minutes:  30     Total Treatment Minutes: 30    Treatment/Activity Tolerance:  [x] Patient tolerated treatment well [] Patient limited by fatigue  [] Patient limited by pain  [] Patient limited by other medical complications  [] Other:     Prognosis: [x] Good [] Fair  [] Poor    Patient Requires Follow-up: [x] Yes  [] No    Plan:   [x] Continue per plan of care [] Alter current plan (see comments)  [] Plan of care initiated [] Hold pending MD visit [] Discharge    Plan for Next Session:  Cont US/manual, cx stabs, resume ex    Electronically signed by:  Oscar Mora

## 2019-06-21 NOTE — PROGRESS NOTES
CHIEF COMPLAINT: Left knee pain after new fall/osteoarthritis, contusion. HISTORY:  Ms. Emelia Root 66 y.o.  female presents today for the first visit for evaluation of left knee pain which started to worsen 4-5 weeks ago after her grandchild ran into her and fell landing on her knee. She is complaining of achy pain and rates a 2/10 VAS. Pain is increase with standing and walking and decrease with rest. Pain is sharp early in the morning with first few steps, dull achy pain by the end of the day. Alleviating factors: rest. No radiation and no numbness and tingling sensation. No other complaint. She is well-known to me for a right lateral mall avulsion fracture on 12/13/2018 and left knee arthritis. She is a retired pediatric physician, endocrinologist.      Past Medical History:   Diagnosis Date    Benign microscopic hematuria 1970    Primary osteoarthritis of left knee 2/26/2019       Past Surgical History:   Procedure Laterality Date    BREAST SURGERY         Social History     Socioeconomic History    Marital status:       Spouse name: Not on file    Number of children: Not on file    Years of education: Not on file    Highest education level: Not on file   Occupational History    Not on file   Social Needs    Financial resource strain: Not on file    Food insecurity:     Worry: Not on file     Inability: Not on file    Transportation needs:     Medical: Not on file     Non-medical: Not on file   Tobacco Use    Smoking status: Never Smoker    Smokeless tobacco: Never Used   Substance and Sexual Activity    Alcohol use: Not on file    Drug use: Not on file    Sexual activity: Not on file   Lifestyle    Physical activity:     Days per week: Not on file     Minutes per session: Not on file    Stress: Not on file   Relationships    Social connections:     Talks on phone: Not on file     Gets together: Not on file     Attends Nondenominational service: Not on file     Active member of club or organization: Not on file     Attends meetings of clubs or organizations: Not on file     Relationship status: Not on file    Intimate partner violence:     Fear of current or ex partner: Not on file     Emotionally abused: Not on file     Physically abused: Not on file     Forced sexual activity: Not on file   Other Topics Concern    Not on file   Social History Narrative    Not on file       Family History   Problem Relation Age of Onset    Cancer Father         Pancreatic    Cancer Mother         lung    Other Brother         benign microscopic hematuria    Heart Disease Maternal Uncle     Early Death Maternal Uncle     Other Maternal Uncle         periarteritis nodosa       Current Outpatient Medications on File Prior to Visit   Medication Sig Dispense Refill    Coenzyme Q10-Levocarnitine 100-20 MG CAPS Take by mouth      aspirin 81 MG tablet Take 81 mg by mouth daily      VITAMIN E PO Take by mouth      KRILL OIL PO Take by mouth      Multiple Vitamins-Minerals (THERAPEUTIC MULTIVITAMIN-MINERALS) tablet Take 1 tablet by mouth daily      rosuvastatin (CRESTOR) 5 MG tablet Take 5 mg by mouth      atorvastatin (LIPITOR) 20 MG tablet Take 20 mg by mouth daily      calcium carbonate (OSCAL) 500 MG TABS tablet Take 500 mg by mouth daily      vitamin D (CHOLECALCIFEROL) 1000 UNITS TABS tablet Take 1,000 Units by mouth daily       No current facility-administered medications on file prior to visit. Pertinent items are noted in HPI  Review of systems reviewed from Patient History Form dated on 1/15/2019 and available in the patient's chart under the Media tab. No change noted. PHYSICAL EXAMINATION:  Ms. Elizabeth Lin is a very pleasant 66 y.o.  female who presents today in no acute distress, awake, alert, and oriented. She is well dressed, nourished and  groomed. Patient with normal affect. Height is  5' 5\" (1.651 m), weight is 178 lb (80.7 kg), Body mass index is 29.62 kg/m². Resting respiratory rate is 16. Examination of the gait, showed that the patient walks heel-toe with a non-antalgic gait and no limp.  Examination of both knees showing full ROM, left mild crepitus, tenderness on medial joint line, stable to varus and valgus stress. She has intact sensation and good pedal pulses. She has good strength in 2 planes, and has mild tenderness on deep palpation over the medial joint line. Knee reflex 1+ bilaterally. IMAGING:  Xray 3 views of the left knee was obtained today in the office and reviewed. These demonstrate mild to moderate degenerative changes with narrowing of the joint space in the medial joint space compartment, subchondral sclerosis, and marginal osteophytosis. IMPRESSION: Left knee DJD, contusion. PLAN: I discussed with the patient the treatment options including both surgical and non-surgical treatment. I discussed with her then no new fracture was seen. We recommended Quad exercises and stretching of the calf and hamstrings which was taught to the patient today. She will take NSAIDS as needed. I discussed with her that she may benefit from a cortisone injection in her left knee but she would like to hold off for now. F/u in 6 weeks, PT if needed.        Shobha Medina MD

## 2019-07-02 ENCOUNTER — HOSPITAL ENCOUNTER (OUTPATIENT)
Dept: PHYSICAL THERAPY | Age: 79
Setting detail: THERAPIES SERIES
Discharge: HOME | End: 2019-07-02
Payer: MEDICARE

## 2019-07-02 PROCEDURE — 97035 APP MDLTY 1+ULTRASOUND EA 15: CPT | Performed by: PHYSICAL THERAPIST

## 2019-07-02 PROCEDURE — 97110 THERAPEUTIC EXERCISES: CPT | Performed by: PHYSICAL THERAPIST

## 2019-07-02 PROCEDURE — 97140 MANUAL THERAPY 1/> REGIONS: CPT | Performed by: PHYSICAL THERAPIST

## 2019-07-16 ENCOUNTER — HOSPITAL ENCOUNTER (OUTPATIENT)
Dept: PHYSICAL THERAPY | Age: 79
Setting detail: THERAPIES SERIES
Discharge: HOME | End: 2019-07-16
Payer: MEDICARE

## 2019-07-16 PROCEDURE — 97035 APP MDLTY 1+ULTRASOUND EA 15: CPT | Performed by: PHYSICAL THERAPIST

## 2019-07-16 PROCEDURE — 97110 THERAPEUTIC EXERCISES: CPT | Performed by: PHYSICAL THERAPIST

## 2019-07-16 PROCEDURE — 97140 MANUAL THERAPY 1/> REGIONS: CPT | Performed by: PHYSICAL THERAPIST

## 2019-07-18 ENCOUNTER — HOSPITAL ENCOUNTER (OUTPATIENT)
Dept: PHYSICAL THERAPY | Age: 79
Setting detail: THERAPIES SERIES
Discharge: HOME | End: 2019-07-18
Payer: MEDICARE

## 2019-07-18 PROCEDURE — 97140 MANUAL THERAPY 1/> REGIONS: CPT | Performed by: PHYSICAL THERAPIST

## 2019-07-18 PROCEDURE — 97110 THERAPEUTIC EXERCISES: CPT | Performed by: PHYSICAL THERAPIST

## 2019-07-18 PROCEDURE — 97035 APP MDLTY 1+ULTRASOUND EA 15: CPT | Performed by: PHYSICAL THERAPIST

## 2019-07-18 NOTE — DISCHARGE SUMMARY
therapy. Will DC PT at this time and continue with HEP. Patient's response to treatment:  Motivates, active, pleasant to work with    Progress towards goals:  Goals met    Current Frequency/Duration:  # Days per week: [] 1 day # Weeks: [] 1 week [] 4 weeks      [x] 2 days? [] 2 weeks [] 5 weeks      [] 3 days   [] 3 weeks [x]  15 weeks     Rehab Potential: [] Excellent [x] Good [] Fair  [] Poor     Goal Status:  [x] Achieved [] Partially Achieved  [] Not Achieved     Patient Status: [] Continue per initial plan of Care     [x] Patient now discharged     [] Additional visits requested, Please re-certify for additional visits:      Requested frequency/duration:  X/week for weeks    Electronically signed by: Erasmo Giang PT    If you have any questions or concerns, please don't hesitate to call.   Thank you for your referral.    Physician Signature:________________________________Date:__________________  By signing above, therapists plan is approved by physician

## 2019-07-18 NOTE — FLOWSHEET NOTE
Physical Therapy Daily Treatment Note    Date:  2019    Patient Name:  Loyd Kc    :  1940  MRN: [id number]  Restrictions/Precautions:    Medical/Treatment Diagnosis Information:   · Diagnosis: Cervicalgia, Cx dystonia  · Treatment Diagnosis: cx dystonia, cervivalgia    Tracking Information:  Physician Information Referring Practitioner: Anastacio Hernández MD     Plan of Care Sent Date: 3/5 Signed Received: 3/6,    Visit Count / Total Visits      Insurance Approved Visits  /  Approved Dates:     Insurance Information PT Insurance Information: Medicare     Progress Note/G-codes   []  Yes  []  No Next Due:      Pain level: 1/10      History:   Patient reports onset of cx dystonia in . She was referred by her PCP for PT. CLOF: sleep disturbance is the main c/o, along with stiffness in her neck. She also reports decreased cx ROM. PLOF: painfree ADLs and sleep, normal AROM. Subjective: Patient  Is considering botox for her dystonia. Pain / mm guarding is min today. Sleep is disturbed but less since starting PT    Objective:  Observation: :CPVM mm guarding is reducing to minimal, L trigger pt in UT present  Test measurements: : Cx AROM:  40 flexion, 50 extension, SB L 35  R 40, Rotation L 80  R 75 degrees                                                    UE strength: WNLs B     Exercises:  Exercise/Equipment Resistance/Repetitions Other comments        UBE retro 2min           Ball on wall - cx Chin nod x 10  cx rotation x 10 L/R    pect str in doorway 2 x 30 sec         TB row Issued for HEP   Cable:  Mid row  High row   15#  2 x 10  15# x 20                                Other Therapeutic Activities:  3/5: Patient educated on PT and plan of care including diagnosis, prognosis, treatment goals and options. Patient verbalized understanding. Educated on sleeping positions - try towel roll in pillowcase    Home Exercise Program:  3/5: Cx Str: UT, LS, SCM, chin tucks, MH.

## 2019-08-26 ENCOUNTER — OFFICE VISIT (OUTPATIENT)
Dept: ENT CLINIC | Age: 79
End: 2019-08-26
Payer: MEDICARE

## 2019-08-26 VITALS
BODY MASS INDEX: 29.19 KG/M2 | HEART RATE: 73 BPM | WEIGHT: 175.4 LBS | DIASTOLIC BLOOD PRESSURE: 70 MMHG | SYSTOLIC BLOOD PRESSURE: 112 MMHG

## 2019-08-26 DIAGNOSIS — H61.23 BILATERAL IMPACTED CERUMEN: ICD-10-CM

## 2019-08-26 DIAGNOSIS — H90.0 CONDUCTIVE HEARING LOSS OF BOTH EARS: ICD-10-CM

## 2019-08-26 DIAGNOSIS — H61.23 HEARING LOSS OF BOTH EARS DUE TO CERUMEN IMPACTION: ICD-10-CM

## 2019-08-26 PROCEDURE — 69210 REMOVE IMPACTED EAR WAX UNI: CPT | Performed by: OTOLARYNGOLOGY

## 2019-08-26 NOTE — PATIENT INSTRUCTIONS
1. Please schedule an audiogram (hearing test) to evaluate your hearing loss. 2. You may use an over the counter ear wax removal kit (such as Murine, Bausch and Lomb, NeilMed, or Debrox wax removal system) for ear wax removal, as needed. 3. It may help to use Debrox (OTC) for 4 days prior to future visits for ear cleaning. This may soften your ear wax and facilitate removal of the wax. NO Q-TIPS OR OTHER INSTRUMENTS/OBJECTS IN THE EARS   You should never clean your ears with a Q-tip, cotton tipped applicator, Lizeth pin, paper clip, safety pin, pen cap, or any other instrument. This will tend to push wax in deeper and pack the ear canal with wax. There is a high risk and danger of this practice, especially rupture of ear drum, dislocation or other damage to ossicles, and permanent, irreversible, and irreparable hearing loss. It may cause inflammation and irritation of the ear canal and cause itching or pain. I recommend only use of one the several ear wax removal kits available \"over the counter\" if you feel a need to try to remove ear wax. For example, Murine, Bausch and Lomb, NeilMed, or Debrox ear wax removal kits may be used for ear wax removal, as needed. No other methods should be self used for cleaning your ears.

## 2020-01-09 ENCOUNTER — HOSPITAL ENCOUNTER (OUTPATIENT)
Dept: WOMENS IMAGING | Age: 80
Discharge: HOME | End: 2020-01-09
Payer: MEDICARE

## 2020-01-09 PROCEDURE — 77063 BREAST TOMOSYNTHESIS BI: CPT

## 2020-06-29 ENCOUNTER — HOSPITAL ENCOUNTER (OUTPATIENT)
Age: 80
Discharge: HOME | End: 2020-06-29
Payer: MEDICARE

## 2020-06-29 ENCOUNTER — HOSPITAL ENCOUNTER (OUTPATIENT)
Dept: GENERAL RADIOLOGY | Age: 80
Discharge: HOME | End: 2020-06-29
Payer: MEDICARE

## 2020-06-29 PROCEDURE — 73090 X-RAY EXAM OF FOREARM: CPT

## 2020-06-29 PROCEDURE — 73030 X-RAY EXAM OF SHOULDER: CPT

## 2020-06-29 PROCEDURE — 73060 X-RAY EXAM OF HUMERUS: CPT

## 2020-08-31 ENCOUNTER — OFFICE VISIT (OUTPATIENT)
Dept: ENT CLINIC | Age: 80
End: 2020-08-31
Payer: MEDICARE

## 2020-08-31 VITALS — DIASTOLIC BLOOD PRESSURE: 74 MMHG | SYSTOLIC BLOOD PRESSURE: 115 MMHG | HEART RATE: 57 BPM | TEMPERATURE: 96 F

## 2020-08-31 PROCEDURE — 69210 REMOVE IMPACTED EAR WAX UNI: CPT | Performed by: OTOLARYNGOLOGY

## 2020-08-31 NOTE — PATIENT INSTRUCTIONS
1. Please schedule an audiogram (hearing test), if your hearing is not back to your usual ability, or if hearing loss or tinnitus (ringing or other noise) persists, despite removal of ear wax,.  2. You may use an over the counter ear wax removal kit (such as Murine, Bausch and Lomb, NeilMed, or Debrox wax removal system) for ear wax removal, as needed. 3. It may help to use Debrox (OTC) for 4 days prior to future visits for ear cleaning. This may soften your ear wax and facilitate removal of the wax. NO Q-TIPS OR OTHER INSTRUMENTS/OBJECTS IN THE EARS   You should never clean your ears with a Q-tip, cotton tipped applicator, Lizeth pin, paper clip, safety pin, pen cap, or any other instrument. This will tend to push wax in deeper and pack the ear canal with wax. There is a high risk and danger of this practice, especially rupture of ear drum, dislocation or other damage to ossicles, and permanent, irreversible, and irreparable hearing loss. It may cause inflammation and irritation of the ear canal and cause itching or pain. I recommend only use of one the several ear wax removal kits available \"over the counter\" if you feel a need to try to remove ear wax. For example, Murine, Bausch and Lomb, NeilMed, or Debrox ear wax removal kits may be used for ear wax removal, as needed. No other methods should be self used for cleaning your ears.

## 2020-08-31 NOTE — PROGRESS NOTES
Chief Complaint   Patient presents with    Hearing Loss    Cerumen Impaction       HISTORY:    Nadege Found stated that the hearing is decreased in both ears, which are plugged up with ear wax. EXAMINATION:    Both external ear canals were occluded by cerumen impaction, obscuring visualization of the TMs. PROCEDURE - REMOVAL OF BILATERAL CERUMEN IMPACTION:   The cerumen impaction was removed from both of the EACs, under otomicroscopy visualization, with instrumentation, using a Billeau wire loop. After successful cerumen removal, the EACs appeared to be normal and clear bilaterally without mass, exudate, or edema. The tympanic membranes appeared to be normal, including normal pneumatic mobility. There was no evidence of acute disease. Nadege Found reported improved hearing, back to usual normal level, after cerumen removal.          IMPRESSION / Paty Hedrick / Foreign Abernathy / PROCEDURES:       Nadege Rowley was seen today for hearing loss and cerumen impaction. Diagnoses and all orders for this visit:    Bilateral impacted cerumen    Conductive hearing loss of both ears         RECOMMENDATIONS / PLAN:   1. See Patient Instructions on file for this visit. 2. Return in about 1 year (around 8/31/2021) for recheck, ear cleaning, and sooner if condition worsens.

## 2020-11-24 ENCOUNTER — HOSPITAL ENCOUNTER (OUTPATIENT)
Dept: PHYSICAL THERAPY | Age: 80
Setting detail: THERAPIES SERIES
Discharge: HOME | End: 2020-11-24
Payer: MEDICARE

## 2020-11-24 PROCEDURE — 97140 MANUAL THERAPY 1/> REGIONS: CPT | Performed by: PHYSICAL THERAPIST

## 2020-11-24 PROCEDURE — 97161 PT EVAL LOW COMPLEX 20 MIN: CPT | Performed by: PHYSICAL THERAPIST

## 2020-11-24 PROCEDURE — 97110 THERAPEUTIC EXERCISES: CPT | Performed by: PHYSICAL THERAPIST

## 2020-11-24 NOTE — FLOWSHEET NOTE
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 Date Range   1 [place] []  Yes  []  No      Latex Allergy:  [x]NO      []YES  Preferred Language for Healthcare:   [x]English       []other:    Functional Scale:        Date assessed:  NDI: raw score = 15; dysfunction =30 %   20    Pain level: /10     SUBJECTIVE:  Patient stated complaint:Patient was diagnosed a few years ago with cx dystonia. She was seen here for therapy in 2019. Pt recently had Botox injections in the L SCM and was referred to PT. Her main c/o is difficulty with cx flexion and mm guarding in her neck.  She reports \"her head to heavy to hold up\".      OBJECTIVE: See eval   Observation:    Test measurements:      RESTRICTIONS/PRECAUTIONS:     Exercises/Interventions:   Therapeutic Exercise (27853) Resistance / level Sets/sec Reps Notes                 Ball on wall - cx  Chin nod  cx rotation  B ER  TB diagnonals   add                         Stretches:  UT,LS, SCM  scalene    20\"     3x ea  add HEP   TB row  TB B ER  TB pullapart  TB B shldr ext Green  green  10  10  Add  add HEP   shldr shrugs 1#  10 HEP   Therapeutic Activities (19022)                                                 NMR re-education (14477)              Wall pushup                            Manual Intervention ([id number])       STM to B CPVM / R>L  MT w & wo hawkgrips  10' Pt seated with gown   Cx mobs   add    Manual cx straction   add                             Patient education:  -pt educated on diagnosis, prognosis and expectations for rehab  -all pt questions were answered    Home exercise program:    Access Code: [email or web address]   URL: [email or web address]   Date: 11/24/2020   Prepared by: Demario Lose     Exercises   Standing Cervical Sidebending AROM - 3 reps - 20 hold - 1x daily - 7x weekly   Cervical AROM Flexion and Rotation - 3 reps - 20 hold - 1x daily - 7x weekly   Sternocleidomastoid Stretch - 3 reps - 20 hold - 1x daily - 7x weekly   Standing Shoulder Shrugs - 10 reps - 2 sets - 1x daily - 7x weekly   Standing Row with Anchored Resistance - 10 reps - 2 sets - 1x daily - 7x weekly   Shoulder External Rotation and Scapular Retraction with Resistance - 10 reps - 1x daily - 7x weekly     Therapeutic Exercise and NMR EXR  [x] (81112) Provided verbal/tactile cueing for activities related to strengthening, flexibility, endurance, ROM  for improvements in cervical, postural, scapular, scapulothoracic and UE control with self care, reaching, carrying, lifting, house/yardwork, driving/computer work.    [] (53787) Provided verbal/tactile cueing for activities related to improving balance, coordination, kinesthetic sense, posture, motor skill, proprioception  to assist with cervical, scapular, scapulothoracic and UE control with self care, reaching, carrying, lifting, house/yardwork, driving/computer work.     Therapeutic Activities:    [] (93826 or 55004) Provided verbal/tactile cueing for activities related to improving balance, coordination, kinesthetic sense, posture, motor skill, proprioception and motor activation to allow for proper function of cervical, scapular, scapulothoracic and UE control with self care, carrying, lifting, driving/computer work. Home Exercise Program:    [x] (96784) Reviewed/Progressed HEP activities related to strengthening, flexibility, endurance, ROM of cervical, scapular, scapulothoracic and UE control with self care, reaching, carrying, lifting, house/yardwork, driving/computer work  [] (48519) Reviewed/Progressed HEP activities related to improving balance, coordination, kinesthetic sense, posture, motor skill, proprioception of cervical, scapular, scapulothoracic and UE control with self care, reaching, carrying, lifting, house/yardwork, driving/computer work      Manual Treatments:  PROM / STM / Oscillations-Mobs:  G-I, II, III, IV (PA's, Inf., Post.)  [x] (92584) Provided manual therapy to mobilize soft tissue/joints of cervical/CT, scapular GHJ and UE for the purpose of decreasing headache, modulating pain, promoting relaxation,  increasing ROM, reducing/eliminating soft tissue swelling/inflammation/restriction, improving soft tissue extensibility and allowing for proper ROM for normal function with self care, reaching, carrying, lifting, house/yardwork, driving/computer work    Modalities:  11/24: MH x 15 min  ( 2 cx sized to neck and scap) - supine with wedge    Charges:  Timed Code Treatment Minutes: 30   Total Treatment Minutes: 55       [x] EVAL - LOW (11876)   [] EVAL - MOD (68472)  [] EVAL - HIGH (14252)  [] RE-EVAL (22146)  [x] TE [phone]) x1      [] Ionto (72861)  [] NMR (95294) x      [] Vaso (13350)  [x] Manual (99430) x 1     [] Ultrasound  [] TA (90266) x      [] Mech Traction (62533)  [] Gait Training x     [] ES (un) (37804):   [] Aquatic therapy x   [] Other:   [] Group:     GOALS:  Patient stated goal: increase ROM and decrease pain of neck  []? Progressing: []? Met: []? Not Met: []?  Adjusted     Therapist goals for Patient: Short Term Goals: To be achieved in: 2 weeks  1. Independent in HEP and progression per patient tolerance, in order to prevent re-injury. []? Progressing: []? Met: []? Not Met: []? Adjusted  2. Patient will have a decrease in pain to facilitate improvement in movement, function, and ADLs as indicated by Functional Deficits. []? Progressing: []? Met: []? Not Met: []? Adjusted     Long Term Goals: To be achieved in: 4-6 weeks  1. Disability index score of 20% or less for the NDI to assist with reaching prior level of function. []? Progressing: []? Met: []? Not Met: []? Adjusted  2. Patient will demonstrate increased AROM to [facility] of cervical/thoracic spine to allow for proper joint functioning as indicated by patients Functional Deficits. []? Progressing: []? Met: []? Not Met: []? Adjusted  3. Patient will demonstrate an increase in postural awareness and control and activation of  Deep cervical stabilizers to allow for proper functional mobility as indicated by patients Functional Deficits. []? Progressing: []? Met: []? Not Met: []? Adjusted  4. Patient will return to functional activities including forward flexion her neck without increased symptoms or restriction. []? Progressing: []? Met: []? Not Met: []? Adjusted  5. Patient will report 50% improvement in the heavy to hold up her head feeling   []? Progressing: []? Met: []? Not Met: []? Adjusted         Overall Progression Towards Functional goals/ Treatment Progress Update:  [] Patient is progressing as expected towards functional goals listed. [] Progression is slowed due to complexities/Impairments listed. [] Progression has been slowed due to co-morbidities.   [x] Plan just implemented, too soon to assess goals progression <30days   [] Goals require adjustment due to lack of progress  [] Patient is not progressing as expected and requires additional follow up with physician  [] Other    Persisting Functional Limitations/Impairments:  []Sitting []Standing   []Walking []Squatting/bending    []Stairs [x]ADL's    [x]Transfers []Reaching  []Housework [x]Lifting  []Driving []Job related tasks  []Sports/Recreation  []Sleeping  []Other:    ASSESSMENT:  See eval  Treatment/Activity Tolerance:  [] Patient able to complete tx  [] Patient limited by fatique  [] Patient limited by pain  [] Patient limited by other medical complications  [] Other:     Prognosis: [x] Good [] Fair  [] Poor    Patient Requires Follow-up: [x] Yes  [] No    Plan for next treatment session: cont manual, add US prn, scapular strengthening    PLAN: See eval. PT 2x / week for 4-6 weeks. [] Continue per plan of care [] Alter current plan (see comments)  [x] Plan of care initiated [] Hold pending MD visit [] Discharge    Electronically signed by: Umesh aB, PT MPT [id number]      Note: If patient does not return for scheduled/ recommended follow up visits, this note will serve as a discharge from care along with most recent update on progress.

## 2020-11-24 NOTE — PLAN OF CARE
Paulo Carrasquillo [address] [facility], [address] [place] [address]  Phone: [phone]  Fax: [phone]         Physical Therapy Certification    Dear Referring Practitioner: Stephen Flores MD,    We had the pleasure of evaluating the following patient for physical therapy services at [address] [place] [address]. A summary of our findings can be found in the initial assessment below. This includes our plan of care. If you have any questions or concerns regarding these findings, please do not hesitate to contact me at the office phone number checked above. Thank you for the referral.       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   [] Yes, Patient intake triggered C-SSRS Screening      [] C-SSRS Screening completed  [] PCP notified via Epic    Latex Allergy:  [x]NO      []YES  Preferred Language for Healthcare:   [x]English       []other:    SUBJECTIVE: Patient stated complaint:Patient was diagnosed a few years ago with cx dystonia. She was seen here for therapy in 2019. Pt recently had Botox injections in the L SCM and was referred to PT. Her main c/o is difficulty with cx flexion and mm guarding in her neck.  She reports \"her head to heavy to hold up\". .     Fear avoidance: I should not do physical activities that (might) make my pain worse   [] True   [x] False     Relevant Medical History: A-fib  Functional Outcome: NDI: raw score = 15; dysfunction = 30%    Pain Scale: 4 /10  Easing factors: at rest - lying down  Provocative factors: looking down    Type: [x]Constant   []Intermittent  []Radiating []Localized []other:     Numbness/Tingling: pt denied    Occupation/School:  retired    Living Status/Prior Level of Function: Prior to this injury / incident, pt was independent with ADLs and IADLs,    OBJECTIVE:   Palpation: Moderate mm guarding in B CPVM    Functional Mobility/Transfers: WNLs    Posture: Guarded - MOD+ mm gaurding    Bandages/Dressings/Incisions: NA    Gait: (include devices/WB status): WNL     Dermatomes Normal Abnormal Comments   Top of head (C1) X     Posterior occipital region (C2) X     Side of neck (C3) X     Top of shoulder (C4) X     Lateral deltoid (C5) X     Tip of thumb (C6) X     Distal middle finger (C7) X     Distal fifth finger (C8) X     Medial forearm (T1) X               Reflexes Normal Abnormal Comments   C5-6 Biceps x     C5-6 Brachioradialis x     C7-8 Triceps x     Chis x         CERV ROM     Cervical Flexion 23    Cervical Extension 22    Cervical SB 30 15   Cervical rotation 60 65        Strength / Myotomes Left Right   Cervical Flexion (C1-2) WNL WNL   Cervical Side-bending (C3) WNL WNL   Shoulder Shrug (C4) WNL WNL   Shoulder Flex 4+ 4+   Shoulder Scap 4+ 4+   Shoulder Abduction (C5) 4+ 4+   Shoulder ER 5 5   Shoulder IR 5 5   Biceps (C6) 5 5   Triceps (C7) 5 5     Lower extremity myotomes:   [x]Normal     []Abnormal     Joint mobility: Cx-spine   [x]Normal    []Hypo   []Hyper                      [x] Patient history, allergies, meds reviewed. Medical chart reviewed. See intake form.      Review Of Systems (ROS):  [x]Performed Review of systems (Integumentary, CardioPulmonary, Neurological) by intake and observation. Intake form has been scanned into medical record. Patient has been instructed to contact their primary care physician regarding ROS issues if not already being addressed at this time. Co-morbidities/Complexities (which will affect course of rehabilitation):   []None           Arthritic conditions   []Rheumatoid arthritis (M05.9)  []Osteoarthritis (M19.91)   Cardiovascular conditions   []Hypertension (I10)  [x]Hyperlipidemia (E78.5)  [x]Angina pectoris (I20) - A-Fib  []Atherosclerosis (I70)   Musculoskeletal conditions   []Disc pathology   []Congenital spine pathologies   []Prior surgical intervention  []Osteoporosis (M81.8)  []Osteopenia (M85.8)   Endocrine conditions   []Hypothyroid (E03.9)  []Hyperthyroid Gastrointestinal conditions   []Constipation (U00.12)   Metabolic conditions   []Morbid obesity (E66.01)  []Diabetes type 1(E10.65) or 2 (E11.65)   []Neuropathy (G60.9)     Pulmonary conditions   []Asthma (J45)  []Coughing   []COPD (J44.9)   Psychological Disorders  []Anxiety (F41.9)  []Depression (F32.9)   []Other:   []Other:          Barriers to/and or personal factors that will affect rehab potential:              []Age  []Sex   []Smoker              []Motivation/Lack of Motivation                        [x]Co-Morbidities              []Cognitive Function, education/learning barriers              []Environmental, home barriers              []profession/work barriers  []past PT/medical experience  []other:  Justification:     Falls Risk Assessment (30 days):  [x] Falls Risk assessed and no intervention required.   [] Falls Risk assessed and Patient requires intervention due to being higher risk   TUG score (>12s at risk):     [] Falls education provided, including         ASSESSMENT:    Functional Impairments:     [x]Noted cervical/thoracic/GHJ joint hypomobility   []Noted cervical/thoracic/GHJ joint hypermobility   [x]Decreased cervical/UE functional ROM   []Noted Headache pain pain   []signs/symptoms consistent with rib dysfunction   [x]signs/symptoms consistent with postural dysfunction   []signs/symptoms consistent with shoulder pathology    []signs/symptoms consistent with post-surgical status including decreased ROM, strength and function. []signs/symptoms consistent with pathology which may benefit from Dry Needling   []signs/symptoms which may limit the use of advanced manual therapy techniques: (Hypertension, recent trauma, intolerance to end range positions, prior TIA, visual issues, UE myotomes loss )     Prognosis/Rehab Potential:      []Excellent   [x]Good    []Fair   []Poor    Tolerance of evaluation/treatment:    []Excellent   [x]Good    []Fair   []Poor    Physical Therapy Evaluation Complexity Justification  [x] A history of present problem with:  [] no personal factors and/or comorbidities that impact the plan of care;  []1-2 personal factors and/or comorbidities that impact the plan of care  []3 personal factors and/or comorbidities that impact the plan of care  [x] An examination of body systems using standardized tests and measures addressing any of the following: body structures and functions (impairments), activity limitations, and/or participation restrictions;:  [x] a total of 1-2 or more elements   [] a total of 3 or more elements   [] a total of 4 or more elements   [x] A clinical presentation with:  [x] stable and/or uncomplicated characteristics   [] evolving clinical presentation with changing characteristics  [] unstable and unpredictable characteristics;   [x] Clinical decision making of [x] low, [] moderate, [] high complexity using standardized patient assessment instrument and/or measurable assessment of functional outcome.     [x] EVAL (LOW) 15112 (typically 20 minutes face-to-face)  [] EVAL (MOD) 93743 (typically 30 minutes face-to-face)  [] EVAL (HIGH) 55995 (typically 45 minutes face-to-face)  [] RE-EVAL     PLAN:   Frequency/Duration:   2days per week for  4-6 Weeks:  Interventions:  [x]  Therapeutic exercise including: strength training, ROM, for cervical spine,scapula, core and Upper extremity, including postural re-education. [x]  NMR activation and proprioception for Deep cervical flexors, periscapular and RC muscles and Core, including postural re-education. [x]  Manual therapy as indicated for C/T spine, ribs, Soft tissue to include: Dry Needling/IASTM, STM, PROM, Gr I-IV mobilizations, manipulation. [x] Modalities as needed that may include: thermal agents, E-stim, Biofeedback, US, iontophoresis as indicated  [x] Patient education on joint protection, postural re-education, activity modification, progression of HEP. HEP instruction: Written HEP instructions provided and reviewed     GOALS:  Patient stated goal: increase ROM and decrease pain of neck  [] Progressing: [] Met: [] Not Met: [] Adjusted    Therapist goals for Patient:   Short Term Goals: To be achieved in: 2 weeks  1. Independent in HEP and progression per patient tolerance, in order to prevent re-injury. [] Progressing: [] Met: [] Not Met: [] Adjusted  2. Patient will have a decrease in pain to facilitate improvement in movement, function, and ADLs as indicated by Functional Deficits. [] Progressing: [] Met: [] Not Met: [] Adjusted    Long Term Goals: To be achieved in: 4-6 weeks  1. Disability index score of 20% or less for the NDI to assist with reaching prior level of function. [] Progressing: [] Met: [] Not Met: [] Adjusted  2. Patient will demonstrate increased AROM to [facility] of cervical/thoracic spine to allow for proper joint functioning as indicated by patients Functional Deficits. [] Progressing: [] Met: [] Not Met: [] Adjusted  3. Patient will demonstrate an increase in postural awareness and control and activation of  Deep cervical stabilizers to allow for proper functional mobility as indicated by patients Functional Deficits.    [] Progressing: [] Met: [] Not Met: []

## 2020-12-01 ENCOUNTER — HOSPITAL ENCOUNTER (OUTPATIENT)
Dept: PHYSICAL THERAPY | Age: 80
Setting detail: THERAPIES SERIES
Discharge: HOME | End: 2020-12-01
Payer: MEDICARE

## 2020-12-01 PROCEDURE — 97110 THERAPEUTIC EXERCISES: CPT | Performed by: PHYSICAL THERAPIST

## 2020-12-01 PROCEDURE — 97140 MANUAL THERAPY 1/> REGIONS: CPT | Performed by: PHYSICAL THERAPIST

## 2020-12-01 PROCEDURE — 97035 APP MDLTY 1+ULTRASOUND EA 15: CPT | Performed by: PHYSICAL THERAPIST

## 2020-12-01 NOTE — FLOWSHEET NOTE
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 Date Range   2 [place] []  Yes  []  No      Latex Allergy:  [x]NO      []YES  Preferred Language for Healthcare:   [x]English       []other:    Functional Scale:        Date assessed:  NDI: raw score = 15; dysfunction =30 %   20    Pain level:1-2 /10     History:  Patient stated complaint:Patient was diagnosed a few years ago with cx dystonia. She was seen here for therapy in 2019. Pt recently had Botox injections in the L SCM and was referred to PT. Her main c/o is difficulty with cx flexion and mm guarding in her neck. She reports \"her head to heavy to hold up\".      SUBJECTIVE: Pt only having discomfort this morning, but was sore with cooking for Thanksgiving. Scap region more sore today; pt notes a crease in her upper back and how much effort it takes to hold up her head.     OBJECTIVE: See eval   Observation:    Test measurements:      RESTRICTIONS/PRECAUTIONS:     Exercises/Interventions:   Therapeutic Exercise (00373) Resistance / level Sets/sec Reps Notes scapulothoracic and UE control with self care, reaching, carrying, lifting, house/yardwork, driving/computer work.    [] (87375) Provided verbal/tactile cueing for activities related to improving balance, coordination, kinesthetic sense, posture, motor skill, proprioception  to assist with cervical, scapular, scapulothoracic and UE control with self care, reaching, carrying, lifting, house/yardwork, driving/computer work. Therapeutic Activities:    [] (08016 or 67901) Provided verbal/tactile cueing for activities related to improving balance, coordination, kinesthetic sense, posture, motor skill, proprioception and motor activation to allow for proper function of cervical, scapular, scapulothoracic and UE control with self care, carrying, lifting, driving/computer work.      Home Exercise Program:    [x] (80005) Reviewed/Progressed HEP activities related to strengthening, flexibility, endurance, ROM of cervical, scapular, scapulothoracic and UE control with self care, reaching, carrying, lifting, house/yardwork, driving/computer work  [] (44235) Reviewed/Progressed HEP activities related to improving balance, coordination, kinesthetic sense, posture, motor skill, proprioception of cervical, scapular, scapulothoracic and UE control with self care, reaching, carrying, lifting, house/yardwork, driving/computer work      Manual Treatments:  PROM / STM / Oscillations-Mobs:  G-I, II, III, IV (PA's, Inf., Post.)  [x] (04649) Provided manual therapy to mobilize soft tissue/joints of cervical/CT, scapular GHJ and UE for the purpose of decreasing headache, modulating pain, promoting relaxation,  increasing ROM, reducing/eliminating soft tissue swelling/inflammation/restriction, improving soft tissue extensibility and allowing for proper ROM for normal function with self care, reaching, carrying, lifting, house/yardwork, driving/computer work    Modalities:  12/1:  x 15 min  ( 2 cx sized to neck and scap) - supine with []? Met: []? Not Met: []? Adjusted         Overall Progression Towards Functional goals/ Treatment Progress Update:  [] Patient is progressing as expected towards functional goals listed. [] Progression is slowed due to complexities/Impairments listed. [] Progression has been slowed due to co-morbidities. [x] Plan just implemented, too soon to assess goals progression <30days   [] Goals require adjustment due to lack of progress  [] Patient is not progressing as expected and requires additional follow up with physician  [] Other    Persisting Functional Limitations/Impairments:  []Sitting []Standing   []Walking []Squatting/bending    []Stairs [x]ADL's    [x]Transfers []Reaching  []Housework [x]Lifting  []Driving []Job related tasks  []Sports/Recreation  []Sleeping  []Other:    ASSESSMENT:  Pt presented with more scapular mm guarding today. R>L. Added some scap PREs and cx stabs. Cont with manual, added US to MT to assist with mm guarding. Also added K-tape to give her postural awareness and take the pressure off the UT and P-S. Treatment/Activity Tolerance:  [] Patient able to complete tx  [] Patient limited by fatique  [] Patient limited by pain  [] Patient limited by other medical complications  [] Other:     Prognosis: [x] Good [] Fair  [] Poor    Patient Requires Follow-up: [x] Yes  [] No    Plan for next treatment session: cont manual, progress scapular strengthening    PLAN: See eval. PT 2x / week for 4-6 weeks. [x] Continue per plan of care [] Alter current plan (see comments)  [] Plan of care initiated [] Hold pending MD visit [] Discharge    Electronically signed by: Roshan Mortensen, PT MPT [id number]      Note: If patient does not return for scheduled/ recommended follow up visits, this note will serve as a discharge from care along with most recent update on progress.

## 2020-12-03 ENCOUNTER — HOSPITAL ENCOUNTER (OUTPATIENT)
Dept: PHYSICAL THERAPY | Age: 80
Setting detail: THERAPIES SERIES
Discharge: HOME | End: 2020-12-03
Payer: MEDICARE

## 2020-12-03 PROCEDURE — 97035 APP MDLTY 1+ULTRASOUND EA 15: CPT | Performed by: PHYSICAL THERAPIST

## 2020-12-03 PROCEDURE — 97140 MANUAL THERAPY 1/> REGIONS: CPT | Performed by: PHYSICAL THERAPIST

## 2020-12-03 PROCEDURE — 97110 THERAPEUTIC EXERCISES: CPT | Performed by: PHYSICAL THERAPIST

## 2020-12-03 NOTE — FLOWSHEET NOTE
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 Date Range   3 [place] []  Yes  []  No      Latex Allergy:  [x]NO      []YES  Preferred Language for Healthcare:   [x]English       []other:    Functional Scale:        Date assessed:  NDI: raw score = 15; dysfunction =30 %   20    Pain level:1-2 /10     History:  Patient stated complaint:Patient was diagnosed a few years ago with cx dystonia. She was seen here for therapy in 2019. Pt recently had Botox injections in the L SCM and was referred to PT. Her main c/o is difficulty with cx flexion and mm guarding in her neck. She reports \"her head to heavy to hold up\".      SUBJECTIVE: Pt liked k-tape but still had spasm at night. Her main c/o is turning head and look behind her.     OBJECTIVE: See eval   Observation:    Test measurements:      RESTRICTIONS/PRECAUTIONS:     Exercises/Interventions:   Therapeutic Exercise (53291) Resistance / level Sets/sec Reps Notes                 Ball on wall - cx  Chin nod  cx rotation  B ER  Bicep curls  TB diagnonals 2#  2#  yellow    10  10x L/R  15  10  10 L/R           cx isometrics  Flex, ext, SB B  5\" 5x ea Added to HEP          Stretches:  UT,LS, SCM  Scalene  Doorway pect str    20\"     3x ea  Add  add HEP  Reviewed technique   TB row  TB B ER  TB pullapart  TB B shldr ext Green  Green  Yellow  Green  10  10  10  add HEP   shldr shrugs 2#  15 HEP   Therapeutic Activities (52521)       CC: Mid row   10#   2   15                                       NMR re-education (99984)              Wall pushup                            Manual Intervention (86834)       STM to B CPVM / R>L  MT w & wo hawkgrips  8' Pt seated with gown   Cx mobs   2x10 Pt supine   Manual cx traction  Manual cx str: UT, SCM   3'  3\" Pt supine  Head of Table into extension for SCM str   K-tape to B UT/MT     Hold this date d/t skin irriated after taken off                     Patient education:12/1: Reviewed care of the tape with the patient and patient knows to remove tape if pain increases or if she notices a skin sensitivity or allergy to the tape. Patient verbalized understanding of all instructions. -pt educated on diagnosis, prognosis and expectations for rehab  -all pt questions were answered    Home exercise program:  Access Code: [email or web address]   URL: [employer]/   Date: 12/03/2020   Prepared by: Sree Standing     Exercises   Standing Isometric Cervical Flexion with Manual Resistance - 10 reps - 5 hold - 1x daily - 7x weekly   Standing Isometric Cervical Sidebending with Manual Resistance - 10 reps - 5 hold - 1x daily - 7x weekly   Standing Isometric Cervical Extension with Manual Resistance - 10 reps - 5 hold - 1x daily - 7x weekly     Access Code: [email or web address]   URL: [employer]/   Date: 11/24/2020   Prepared by: Sree Standing     Exercises   Standing Cervical Sidebending AROM - 3 reps - 20 hold - 1x daily - 7x weekly   Cervical AROM Flexion and Rotation - 3 reps - 20 hold - 1x daily - 7x weekly Post.)  [x] (70248) Provided manual therapy to mobilize soft tissue/joints of cervical/CT, scapular GHJ and UE for the purpose of decreasing headache, modulating pain, promoting relaxation,  increasing ROM, reducing/eliminating soft tissue swelling/inflammation/restriction, improving soft tissue extensibility and allowing for proper ROM for normal function with self care, reaching, carrying, lifting, house/yardwork, driving/computer work    Modalities:  12/3: MH x 15 min  ( 2 cx sized to neck and scap) - supine with wedge    Charges:  Timed Code Treatment Minutes: 30   Total Treatment Minutes: 60       [] EVAL - LOW (54785)   [] EVAL - MOD (10280)  [] EVAL - HIGH (48486)  [] RE-EVAL (12936)  [x] TE (39360) x1      [] Ionto (60646)  [] NMR (00191) x      [] Vaso (62434)  [x] Manual (01868) x 1     [x] Ultrasound  [] TA (48624) x      [] Mech Traction (30633)  [] Gait Training x     [] ES (un) (65126):   [] Aquatic therapy x   [] Other:   [] Group:     GOALS:  Patient stated goal: increase ROM and decrease pain of neck  []? Progressing: []? Met: []? Not Met: []? Adjusted     Therapist goals for Patient:   Short Term Goals: To be achieved in: 2 weeks  1. Independent in HEP and progression per patient tolerance, in order to prevent re-injury. [x]? Progressing: []? Met: []? Not Met: []? Adjusted  2. Patient will have a decrease in pain to facilitate improvement in movement, function, and ADLs as indicated by Functional Deficits. [x]? Progressing: []? Met: []? Not Met: []? Adjusted     Long Term Goals: To be achieved in: 4-6 weeks  1. Disability index score of 20% or less for the NDI to assist with reaching prior level of function. [x]? Progressing: []? Met: []? Not Met: []? Adjusted  2. Patient will demonstrate increased AROM to [facility] of cervical/thoracic spine to allow for proper joint functioning as indicated by patients Functional Deficits. [x]? Progressing: []? Met: []? Not Met: []? Adjusted  3.  Patient will demonstrate an increase in postural awareness and control and activation of  Deep cervical stabilizers to allow for proper functional mobility as indicated by patients Functional Deficits. [x]? Progressing: []? Met: []? Not Met: []? Adjusted  4. Patient will return to functional activities including forward flexion her neck without increased symptoms or restriction. [x]? Progressing: []? Met: []? Not Met: []? Adjusted  5. Patient will report 50% improvement in the heavy to hold up her head feeling   [x]? Progressing: []? Met: []? Not Met: []? Adjusted         Overall Progression Towards Functional goals/ Treatment Progress Update:  [] Patient is progressing as expected towards functional goals listed. [] Progression is slowed due to complexities/Impairments listed. [] Progression has been slowed due to co-morbidities. [x] Plan just implemented, too soon to assess goals progression <30days   [] Goals require adjustment due to lack of progress  [] Patient is not progressing as expected and requires additional follow up with physician  [] Other    Persisting Functional Limitations/Impairments:  []Sitting []Standing   []Walking []Squatting/bending    []Stairs [x]ADL's    [x]Transfers []Reaching  []Housework [x]Lifting  []Driving []Job related tasks  []Sports/Recreation  []Sleeping  []Other:    ASSESSMENT:  Pt presented with less scapular mm guarding today. Progressed scap PREs and cx stabs. Cont with manual, US and HP to assist with mm guarding. Held tape today since when took off skin was irritated. Treatment/Activity Tolerance:  [] Patient able to complete tx  [] Patient limited by fatique  [] Patient limited by pain  [] Patient limited by other medical complications  [] Other:     Prognosis: [x] Good [] Fair  [] Poor    Patient Requires Follow-up: [x] Yes  [] No    Plan for next treatment session: cont manual, progress scapular strengthening    PLAN: See eval. PT 2x / week for 4-6 weeks.    [x] Continue per plan of care [] Alter current plan (see comments)  [] Plan of care initiated [] Hold pending MD visit [] Discharge    Electronically signed by: Sree Ames, PT MPT [id number]      Note: If patient does not return for scheduled/ recommended follow up visits, this note will serve as a discharge from care along with most recent update on progress.

## 2020-12-08 ENCOUNTER — HOSPITAL ENCOUNTER (OUTPATIENT)
Dept: PHYSICAL THERAPY | Age: 80
Setting detail: THERAPIES SERIES
Discharge: HOME | End: 2020-12-08
Payer: MEDICARE

## 2020-12-08 PROCEDURE — 97140 MANUAL THERAPY 1/> REGIONS: CPT | Performed by: PHYSICAL THERAPIST

## 2020-12-08 PROCEDURE — 97110 THERAPEUTIC EXERCISES: CPT | Performed by: PHYSICAL THERAPIST

## 2020-12-08 PROCEDURE — 97035 APP MDLTY 1+ULTRASOUND EA 15: CPT | Performed by: PHYSICAL THERAPIST

## 2020-12-08 NOTE — FLOWSHEET NOTE
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 Date Range   4 [place] []  Yes  []  No      Latex Allergy:  [x]NO      []YES  Preferred Language for Healthcare:   [x]English       []other:    Functional Scale:        Date assessed:  NDI: raw score = 15; dysfunction =30 %   20    Pain level:1-2 10     History:  Patient stated complaint:Patient was diagnosed a few years ago with cx dystonia. She was seen here for therapy in 2019. Pt recently had Botox injections in the L SCM and was referred to PT. Her main c/o is difficulty with cx flexion and mm guarding in her neck. She reports \"her head to heavy to hold up\".      SUBJECTIVE: Pt report being able to turn her head and look behind her after last session.      OBJECTIVE: See eval   Observation:    Test measurements:      RESTRICTIONS/PRECAUTIONS:     Exercises/Interventions:   Therapeutic Exercise (75457) Resistance / level Sets/sec Reps Notes                 Ball on wall - cx  Chin nod  cx rotation  B ER  Bicep curls  TB diagnonals       2#  2#  yellow    10  10x L/R  15  10  10 L/R           cx isometrics  Flex, ext, SB B  5\" 5x ea Added to HEP          Stretches:  UT,LS, SCM  Scalene  Doorway pect str    20\"     3x ea  Add  add HEP  Reviewed technique   TB row  TB B ER  TB pullapart  TB B shldr ext Green  Green  Yellow  Green  10  10  10  add HEP   shldr shrugs 2#  15 HEP   Therapeutic Activities (71073)       CC: Mid row   10#   2   15                                       NMR re-education (53847)              Wall pushup   add                         Manual Intervention (11045)       STM to B CPVM / R>L  MT w & wo hawkgrips  8' Pt seated with gown   Cx mobs   2x10 Pt supine   Manual cx traction  Manual cx str: UT, SCM   3'  3\" Pt supine  Head of Table into extension for SCM str   K-tape to B UT/MT     Hold this date d/t skin irriated after taken off                     Patient education:12/1: Reviewed care of the tape with the patient and patient knows to remove tape if pain increases or if she notices a skin sensitivity or allergy to the tape. Patient verbalized understanding of all instructions. -pt educated on diagnosis, prognosis and expectations for rehab  -all pt questions were answered    Home exercise program:  Access Code: [email or web address]   URL: [email or web address]/   Date: 12/03/2020   Prepared by: Elie Miranda     Exercises   Standing Isometric Cervical Flexion with Manual Resistance - 10 reps - 5 hold - 1x daily - 7x weekly   Standing Isometric Cervical Sidebending with Manual Resistance - 10 reps - 5 hold - 1x daily - 7x weekly   Standing Isometric Cervical Extension with Manual Resistance - 10 reps - 5 hold - 1x daily - 7x weekly     Access Code: [email or web address]   URL: [employer]/   Date: 11/24/2020   Prepared by: Elie Miranda     Exercises   Standing Cervical Sidebending AROM - 3 reps - 20 hold - 1x daily - 7x weekly   Cervical AROM Flexion and Rotation - 3 reps - 20 hold - 1x daily - 7x weekly   Sternocleidomastoid Stretch - 3 reps - 20 hold - 1x daily - 7x weekly   Standing Shoulder Shrugs - 10 reps - 2 sets - 1x daily - 7x weekly   Standing Row with Anchored Resistance - 10 reps - 2 sets - 1x daily - 7x weekly   Shoulder External Rotation and Scapular Retraction with Resistance - 10 reps - 1x daily - 7x weekly     Therapeutic Exercise and NMR EXR  [x] (11928) Provided verbal/tactile cueing for activities related to strengthening, flexibility, endurance, ROM  for improvements in cervical, postural, scapular, scapulothoracic and UE control with self care, reaching, carrying, lifting, house/yardwork, driving/computer work.    [] (17768) Provided verbal/tactile cueing for activities related to improving balance, coordination, kinesthetic sense, posture, motor skill, proprioception  to assist with cervical, scapular, scapulothoracic and UE control with self care, reaching, carrying, lifting, house/yardwork, driving/computer work. Therapeutic Activities:    [] (77403 or 17837) Provided verbal/tactile cueing for activities related to improving balance, coordination, kinesthetic sense, posture, motor skill, proprioception and motor activation to allow for proper function of cervical, scapular, scapulothoracic and UE control with self care, carrying, lifting, driving/computer work.      Home Exercise Program:    [x] (87989) Reviewed/Progressed HEP activities related to strengthening, flexibility, endurance, ROM of cervical, scapular, scapulothoracic and UE control with self care, reaching, carrying, lifting, house/yardwork, driving/computer work  [] (67122) Reviewed/Progressed HEP activities related to improving balance, coordination, kinesthetic sense, posture, motor skill, proprioception of cervical, scapular, scapulothoracic and UE control with self care, reaching, carrying, lifting, house/yardwork, driving/computer work      Manual Treatments:  PROM / STM / Oscillations-Mobs:  G-I, II, III, IV (PA's, Inf., Post.)  [x] (95153) Provided manual therapy to mobilize soft tissue/joints of cervical/CT, scapular GHJ and UE for the purpose of decreasing headache, modulating pain, promoting relaxation,  increasing ROM, reducing/eliminating soft tissue swelling/inflammation/restriction, improving soft tissue extensibility and allowing for proper ROM for normal function with self care, reaching, carrying, lifting, house/yardwork, driving/computer work    Modalities:  12/8: US @ 100% 1.5w/cm to B Cx and scap regions with biofreeze x 8min, MH x 15 min  ( 2 cx sized to neck and scap) - supine with wedge    Charges:  Timed Code Treatment Minutes: 45   Total Treatment Minutes: 60       [] EVAL - LOW (32285)   [] EVAL - MOD (06223)  [] EVAL - HIGH (21423)  [] RE-EVAL (87102)  [x] TE (74085) x1      [] Ionto (84395)  [] NMR (64554) x      [] Vaso (36573)  [x] Manual (94366) x 1     [x] Ultrasound  [] TA (56134) x      [] Mech Traction (19764)  [] Gait Training x     [] ES (un) (82688):   [] Aquatic therapy x   [] Other:   [] Group:     GOALS:  Patient stated goal: increase ROM and decrease pain of neck  [x]? Progressing: []? Met: []? Not Met: []? Adjusted     Therapist goals for Patient:   Short Term Goals: To be achieved in: 2 weeks  1. Independent in HEP and progression per patient tolerance, in order to prevent re-injury. [x]? Progressing: []? Met: []? Not Met: []? Adjusted  2. Patient will have a decrease in pain to facilitate improvement in movement, function, and ADLs as indicated by Functional Deficits. [x]? Progressing: []? Met: []? Not Met: []? Adjusted     Long Term Goals: To be achieved in: 4-6 weeks  1. Disability index score of 20% or less for the NDI to assist with reaching prior level of function. [x]? Progressing: []? Met: []? Not Met: []? Adjusted  2. Patient will demonstrate increased AROM to [facility] of cervical/thoracic spine to allow for proper joint functioning as indicated by patients Functional Deficits. [x]? Progressing: []? Met: []?  Not Met: []? Adjusted  3. Patient will demonstrate an increase in postural awareness and control and activation of  Deep cervical stabilizers to allow for proper functional mobility as indicated by patients Functional Deficits. [x]? Progressing: []? Met: []? Not Met: []? Adjusted  4. Patient will return to functional activities including forward flexion her neck without increased symptoms or restriction. [x]? Progressing: []? Met: []? Not Met: []? Adjusted  5. Patient will report 50% improvement in the heavy to hold up her head feeling   [x]? Progressing: []? Met: []? Not Met: []? Adjusted         Overall Progression Towards Functional goals/ Treatment Progress Update:  [] Patient is progressing as expected towards functional goals listed. [x] Progression is slowed due to complexities/Impairments listed. [] Progression has been slowed due to co-morbidities. [] Plan just implemented, too soon to assess goals progression <30days   [] Goals require adjustment due to lack of progress  [] Patient is not progressing as expected and requires additional follow up with physician  [] Other    Persisting Functional Limitations/Impairments:  []Sitting []Standing   []Walking []Squatting/bending    []Stairs [x]ADL's    [x]Transfers []Reaching  []Housework [x]Lifting  []Driving []Job related tasks  []Sports/Recreation  []Sleeping  []Other:    ASSESSMENT:  Pt presented with more L scapular mm guarding today. Cont with manual, US and HP to assist with mm guarding. Pt has noted functional improvement since starting PT. Treatment/Activity Tolerance:  [x] Patient able to complete tx  [] Patient limited by fatique  [] Patient limited by pain  [] Patient limited by other medical complications  [] Other:     Prognosis: [x] Good [] Fair  [] Poor    Patient Requires Follow-up: [x] Yes  [] No    Plan for next treatment session: cont manual, progress scapular strengthening    PLAN: See eval. PT 2x / week for 4-6 weeks.    [x] Continue per plan of care [] Alter current plan (see comments)  [] Plan of care initiated [] Hold pending MD visit [] Discharge    Electronically signed by: Lucrecia Mcintosh, PT MPT [id number]      Note: If patient does not return for scheduled/ recommended follow up visits, this note will serve as a discharge from care along with most recent update on progress.

## 2020-12-10 ENCOUNTER — HOSPITAL ENCOUNTER (OUTPATIENT)
Dept: PHYSICAL THERAPY | Age: 80
Setting detail: THERAPIES SERIES
Discharge: HOME | End: 2020-12-10
Payer: MEDICARE

## 2020-12-10 PROCEDURE — 97110 THERAPEUTIC EXERCISES: CPT | Performed by: PHYSICAL THERAPIST

## 2020-12-10 PROCEDURE — 97035 APP MDLTY 1+ULTRASOUND EA 15: CPT | Performed by: PHYSICAL THERAPIST

## 2020-12-10 PROCEDURE — 97140 MANUAL THERAPY 1/> REGIONS: CPT | Performed by: PHYSICAL THERAPIST

## 2020-12-10 NOTE — FLOWSHEET NOTE
[address]  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 Date Range   [place] []  Yes  []  No      Latex Allergy:  [x]NO      []YES  Preferred Language for Healthcare:   [x]English       []other:    Functional Scale:        Date assessed:  NDI: raw score = 15; dysfunction =30 %   20    Pain level:1-2 /10     History:  Patient stated complaint:Patient was diagnosed a few years ago with cx dystonia. She was seen here for therapy in 2019. Pt recently had Botox injections in the L SCM and was referred to PT. Her main c/o is difficulty with cx flexion and mm guarding in her neck. She reports \"her head to heavy to hold up\".      SUBJECTIVE: Pt report less scap mm guarding this date.     OBJECTIVE: See eval   Observation:    Test measurements:      RESTRICTIONS/PRECAUTIONS:     Exercises/Interventions:   Therapeutic Exercise (88127) Resistance / level Sets/sec Reps Notes                 Ball on wall - cx  Chin nod  cx rotation  B ER  Bicep curls  TB diagnonals  TB pullapart       2#  2#  Yellow  yellow    10  10x L/R  15  10  10 L/R  10             Added to HEP          Stretches:  UT,LS, SCM  Scalene  Doorway pect str      20\"  30\"       2B  2 HEP  Reviewed technique   TB row  TB B ER  TB B shldr ext [place]  Green  10  10  add HEP   shldr shrugs 2#  15 HEP   Therapeutic Activities (93485)       CC: Mid row   12.5#   2   15                                       NMR re-education (76902)              Wall pushup   add                         Manual Intervention ([id number])       STM to B CPVM / R>L  MT w & wo hawkgrips  8' Pt seated with gown   Cx mobs   2x10 Pt supine   Manual cx traction  Manual cx str: UT, LS,SCM   3'  3\" Pt supine  Head of Table into extension for SCM str   K-tape to B UT/MT     Hold this date d/t skin irriated after taken off                     Patient education:12/1: Reviewed care of the tape with the patient and patient knows to remove tape if pain increases or if she notices a skin sensitivity or allergy to the tape. Patient verbalized understanding of all instructions. -pt educated on diagnosis, prognosis and expectations for rehab  -all pt questions were answered    Home exercise program:  Access Code: [email or web address]   URL: [email or web address]   Date: 12/10/2020   Prepared by: Crystal Vaughn     Exercises   Seated Scalenes Stretch - 2-3 reps - 15 hold - 1x daily - 7x weekly   Doorway Pec Stretch at 90 Degrees Abduction - 2 reps - 30 hold - 1x daily - 7x weekly       Access Code: [email or web address]   URL: [email or web address]   Date: 12/03/2020   Prepared by: Crystal Vaughn     Exercises   Standing Isometric Cervical Flexion with Manual Resistance - 10 reps - 5 hold - 1x daily - 7x weekly   Standing Isometric Cervical Sidebending with Manual Resistance - 10 reps - 5 hold - 1x daily - 7x weekly   Standing Isometric Cervical Extension with Manual Resistance - 10 reps - 5 hold - 1x daily - 7x weekly     Access Code: [email or web address]   URL: [employer]/   Date: 11/24/2020   Prepared by: Sam Brisk     Exercises   Standing Cervical Sidebending AROM - 3 reps - 20 hold - 1x daily - 7x weekly   Cervical AROM Flexion and Rotation - 3 reps - 20 hold - 1x daily - 7x weekly   Sternocleidomastoid Stretch - 3 reps - 20 hold - 1x daily - 7x weekly   Standing Shoulder Shrugs - 10 reps - 2 sets - 1x daily - 7x weekly   Standing Row with Anchored Resistance - 10 reps - 2 sets - 1x daily - 7x weekly   Shoulder External Rotation and Scapular Retraction with Resistance - 10 reps - 1x daily - 7x weekly     Therapeutic Exercise and NMR EXR  [x] (56556) Provided verbal/tactile cueing for activities related to strengthening, flexibility, endurance, ROM  for improvements in cervical, postural, scapular, scapulothoracic and UE control with self care, reaching, carrying, lifting, house/yardwork, driving/computer work.    [] (13712) Provided verbal/tactile cueing for activities related to improving balance, coordination, kinesthetic sense, posture, motor skill, proprioception  to assist with cervical, scapular, scapulothoracic and UE control with self care, reaching, carrying, lifting, house/yardwork, driving/computer work. Therapeutic Activities:    [] (84784 or 33093) Provided verbal/tactile cueing for activities related to improving balance, coordination, kinesthetic sense, posture, motor skill, proprioception and motor activation to allow for proper function of cervical, scapular, scapulothoracic and UE control with self care, carrying, lifting, driving/computer work.      Home Exercise Program:    [x] (82625) Reviewed/Progressed HEP activities related to strengthening, flexibility, endurance, ROM of cervical, scapular, scapulothoracic and UE control with self care, reaching, carrying, lifting, house/yardwork, driving/computer work  [] (21853) Reviewed/Progressed HEP activities related to improving balance, coordination, kinesthetic sense, posture, motor skill, proprioception of cervical, scapular, scapulothoracic and UE control with self care, reaching, carrying, lifting, house/yardwork, driving/computer work      Manual Treatments:  PROM / STM / Oscillations-Mobs:  G-I, II, III, IV (PA's, Inf., Post.)  [x] (58004) Provided manual therapy to mobilize soft tissue/joints of cervical/CT, scapular GHJ and UE for the purpose of decreasing headache, modulating pain, promoting relaxation,  increasing ROM, reducing/eliminating soft tissue swelling/inflammation/restriction, improving soft tissue extensibility and allowing for proper ROM for normal function with self care, reaching, carrying, lifting, house/yardwork, driving/computer work    Modalities:  12/10: [address] @ 100% 1.5w/cm to B Cx and scap regions with biofreeze x 8min, MH x 15 min  ( 2 cx sized to neck and scap) - supine with wedge    Charges:  Timed Code Treatment Minutes: 45   Total Treatment Minutes: 60       [] EVAL - LOW (90421)   [] EVAL - MOD (00712)  [] EVAL - HIGH (24904)  [] RE-EVAL (45723)  [x] TE (19514) x1      [] Ionto (75121)  [] NMR (88015) x      [] Vaso (78900)  [x] Manual (28584) x 1     [x] Ultrasound  [] TA (67080) x      [] Mech Traction (69754)  [] Gait Training x     [] ES (un) (00452):   [] Aquatic therapy x   [] Other:   [] Group:     GOALS:  Patient stated goal: increase ROM and decrease pain of neck  [x]? Progressing: []? Met: []? Not Met: []? Adjusted     Therapist goals for Patient:   Short Term Goals: To be achieved in: 2 weeks  1. Independent in HEP and progression per patient tolerance, in order to prevent re-injury. [x]? Progressing: []? Met: []? Not Met: []? Adjusted  2. Patient will have a decrease in pain to facilitate improvement in movement, function, and ADLs as indicated by Functional Deficits. [x]? Progressing: []? Met: []? Not Met: []? Adjusted     Long Term Goals: To be achieved in: 4-6 weeks  1. Disability index score of 20% or less for the NDI to assist with reaching prior level of function. [x]?  Progressing: []? Met: []? Not Met: []? Adjusted  2. Patient will demonstrate increased AROM to [facility] of cervical/thoracic spine to allow for proper joint functioning as indicated by patients Functional Deficits. [x]? Progressing: []? Met: []? Not Met: []? Adjusted  3. Patient will demonstrate an increase in postural awareness and control and activation of  Deep cervical stabilizers to allow for proper functional mobility as indicated by patients Functional Deficits. [x]? Progressing: []? Met: []? Not Met: []? Adjusted  4. Patient will return to functional activities including forward flexion her neck without increased symptoms or restriction. [x]? Progressing: []? Met: []? Not Met: []? Adjusted  5. Patient will report 50% improvement in the heavy to hold up her head feeling   [x]? Progressing: []? Met: []? Not Met: []? Adjusted         Overall Progression Towards Functional goals/ Treatment Progress Update:  [] Patient is progressing as expected towards functional goals listed. [x] Progression is slowed due to complexities/Impairments listed. [] Progression has been slowed due to co-morbidities. [] Plan just implemented, too soon to assess goals progression <30days   [] Goals require adjustment due to lack of progress  [] Patient is not progressing as expected and requires additional follow up with physician  [] Other    Persisting Functional Limitations/Impairments:  []Sitting []Standing   []Walking []Squatting/bending    []Stairs [x]ADL's    [x]Transfers []Reaching  []Housework [x]Lifting  []Driving []Job related tasks  []Sports/Recreation  []Sleeping  []Other:    ASSESSMENT:  Pt has less scapular mm guarding today. Cont with manual, US and HP to assist with mm guarding. Pt had mm twitching with manual stretching as consistent with dystonia. Pt has noted functional improvement since starting PT.     Treatment/Activity Tolerance:  [x] Patient able to complete tx  [] Patient limited by mehrdad  [] Patient limited by pain  [] Patient limited by other medical complications  [] Other:     Prognosis: [x] Good [] Fair  [] Poor    Patient Requires Follow-up: [x] Yes  [] No    Plan for next treatment session: cont manual, progress scapular strengthening    PLAN: See dayanna. PT 2x / week for 4-6 weeks. [x] Continue per plan of care [] Alter current plan (see comments)  [] Plan of care initiated [] Hold pending MD visit [] Discharge    Electronically signed by: Ana Madrid, PT MPT [id number]      Note: If patient does not return for scheduled/ recommended follow up visits, this note will serve as a discharge from care along with most recent update on progress.

## 2020-12-14 ENCOUNTER — HOSPITAL ENCOUNTER (OUTPATIENT)
Dept: PHYSICAL THERAPY | Age: 80
Setting detail: THERAPIES SERIES
Discharge: HOME | End: 2020-12-14
Payer: MEDICARE

## 2020-12-14 PROCEDURE — 97035 APP MDLTY 1+ULTRASOUND EA 15: CPT | Performed by: PHYSICAL THERAPIST

## 2020-12-14 PROCEDURE — 97110 THERAPEUTIC EXERCISES: CPT | Performed by: PHYSICAL THERAPIST

## 2020-12-14 PROCEDURE — 97140 MANUAL THERAPY 1/> REGIONS: CPT | Performed by: PHYSICAL THERAPIST

## 2020-12-14 NOTE — FLOWSHEET NOTE
[address]  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 Date Range   6 [place] []  Yes  []  No      Latex Allergy:  [x]NO      []YES  Preferred Language for Healthcare:   [x]English       []other:    Functional Scale:        Date assessed:  NDI: raw score = 15; dysfunction =30 %   20    Pain level:1-2 10     History:  Patient stated complaint:Patient was diagnosed a few years ago with cx dystonia. She was seen here for therapy in 2019. Pt recently had Botox injections in the L SCM and was referred to PT. Her main c/o is difficulty with cx flexion and mm guarding in her neck. She reports \"her head to heavy to hold up\".      SUBJECTIVE: Pt report more L scap mm guarding this date.     OBJECTIVE: See eval  ? Observation:   ? Test measurements:      RESTRICTIONS/PRECAUTIONS:     Exercises/Interventions:   Therapeutic Exercise (87763) Resistance / level Sets/sec Reps Notes                 Ball on wall - cx  Chin nod  cx rotation  B ER  Bicep curls  TB diagnonals  TB pullapart       2#  2#  Yellow  yellow    10  10x L/R  15 10  10 L/R  10             Added to HEP          Stretches:  UT,LS, SCM  Scalene  Doorway pect str      20\"  30\"       2B  2 HEP  Reviewed technique   TB row  TB B ER  TB B shldr ext [place]  Green  10  10  add HEP   shldr shrugs 2#  15 HEP   Therapeutic Activities (26481)       CC: Mid row   12.5#   2   15                                       NMR re-education (42308)              Wall pushup   add                         Manual Intervention ([address] [facility])       STM to B CPVM / R>L  MT w & wo hawkgrips  8' Pt seated with gown   Cx mobs   2x10 Pt supine   Manual cx traction  Manual cx str: UT, LS,SCM   3'  3\" Pt supine  Head of Table into extension for SCM str   K-tape to B UT/MT     Hold this date d/t skin irriated after taken off                     Patient education:12/1: Reviewed care of the tape with the patient and patient knows to remove tape if pain increases or if she notices a skin sensitivity or allergy to the tape. Patient verbalized understanding of all instructions. -pt educated on diagnosis, prognosis and expectations for rehab  -all pt questions were answered    Home exercise program:  Access Code: [email or web address]   URL: [employer]/   Date: 12/10/2020   Prepared by: Ana Madrid     Exercises   Seated Scalenes Stretch - 2-3 reps - 15 hold - 1x daily - 7x weekly   Doorway Pec Stretch at 90 Degrees Abduction - 2 reps - 30 hold - 1x daily - 7x weekly       Access Code: [email or web address]   URL: [employer]/   Date: 12/03/2020   Prepared by: Ana Madrid     Exercises   Standing Isometric Cervical Flexion with Manual Resistance - 10 reps - 5 hold - 1x daily - 7x weekly   Standing Isometric Cervical Sidebending with Manual Resistance - 10 reps - 5 hold - 1x daily - 7x weekly   Standing Isometric Cervical Extension with Manual Resistance - 10 reps - 5 hold - 1x daily - 7x weekly     Access Code: [email or web address]   URL: [employer]/   Date: 11/24/2020 [] (64776) Reviewed/Progressed HEP activities related to improving balance, coordination, kinesthetic sense, posture, motor skill, proprioception of cervical, scapular, scapulothoracic and UE control with self care, reaching, carrying, lifting, house/yardwork, driving/computer work      Manual Treatments:  PROM / STM / Oscillations-Mobs:  G-I, II, III, IV (PA's, Inf., Post.)  [x] (79896) Provided manual therapy to mobilize soft tissue/joints of cervical/CT, scapular GHJ and UE for the purpose of decreasing headache, modulating pain, promoting relaxation,  increasing ROM, reducing/eliminating soft tissue swelling/inflammation/restriction, improving soft tissue extensibility and allowing for proper ROM for normal function with self care, reaching, carrying, lifting, house/yardwork, driving/computer work    Modalities:  12/14: US @ 100% 1.5w/cm to B Cx and scap regions with biofreeze x 8min, MH x 15 min  ( 2 cx sized to neck and scap) - supine with wedge    Charges:  Timed Code Treatment Minutes: 45   Total Treatment Minutes: 60       [] EVAL - LOW (46994)   [] EVAL - MOD (57342)  [] EVAL - HIGH (88111)  [] RE-EVAL (03740)  [x] TE (98644) x1      [] Ionto (89424)  [] NMR (66383) x      [] Vaso (23048)  [x] Manual (22614) x 1     [x] Ultrasound  [] TA (51071) x      [] Mech Traction (79866)  [] Gait Training x     [] ES (un) (30113):   [] Aquatic therapy x   [] Other:   [] Group:     GOALS:  Patient stated goal: increase ROM and decrease pain of neck  [x]? Progressing: []? Met: []? Not Met: []? Adjusted     Therapist goals for Patient:   Short Term Goals: To be achieved in: 2 weeks  1. Independent in HEP and progression per patient tolerance, in order to prevent re-injury. [x]? Progressing: []? Met: []? Not Met: []? Adjusted  2. Patient will have a decrease in pain to facilitate improvement in movement, function, and ADLs as indicated by Functional Deficits. [x]? Progressing: []? Met: []? Not Met: []?  Adjusted    Long Term Goals: To be achieved in: 4-6 weeks  1. Disability index score of 20% or less for the NDI to assist with reaching prior level of function. [x]? Progressing: []? Met: []? Not Met: []? Adjusted  2. Patient will demonstrate increased AROM to [facility] of cervical/thoracic spine to allow for proper joint functioning as indicated by patients Functional Deficits. [x]? Progressing: []? Met: []? Not Met: []? Adjusted  3. Patient will demonstrate an increase in postural awareness and control and activation of  Deep cervical stabilizers to allow for proper functional mobility as indicated by patients Functional Deficits. [x]? Progressing: []? Met: []? Not Met: []? Adjusted  4. Patient will return to functional activities including forward flexion her neck without increased symptoms or restriction. [x]? Progressing: []? Met: []? Not Met: []? Adjusted  5. Patient will report 50% improvement in the heavy to hold up her head feeling   [x]? Progressing: []? Met: []? Not Met: []? Adjusted         Overall Progression Towards Functional goals/ Treatment Progress Update:  [] Patient is progressing as expected towards functional goals listed. [x] Progression is slowed due to complexities/Impairments listed. [] Progression has been slowed due to co-morbidities. [] Plan just implemented, too soon to assess goals progression <30days   [] Goals require adjustment due to lack of progress  [] Patient is not progressing as expected and requires additional follow up with physician  [] Other    Persisting Functional Limitations/Impairments:  []Sitting []Standing   []Walking []Squatting/bending    []Stairs [x]ADL's    [x]Transfers []Reaching  []Housework [x]Lifting  []Driving []Job related tasks  []Sports/Recreation  []Sleeping  []Other:    ASSESSMENT:  Pt has more L-sided mm guarding today. Cont with manual, US and HP to assist with mm guarding. Pt has noted functional improvement since starting PT. Treatment/Activity Tolerance:  [x] Patient able to complete tx  [] Patient limited by fatique  [] Patient limited by pain  [] Patient limited by other medical complications  [] Other:     Prognosis: [x] Good [] Fair  [] Poor    Patient Requires Follow-up: [x] Yes  [] No    Plan for next treatment session: cont manual, progress scapular strengthening    PLAN: See eval. PT 2x / week for 4-6 weeks. [x] Continue per plan of care [] Alter current plan (see comments)  [] Plan of care initiated [] Hold pending MD visit [] Discharge    Electronically signed by: Judith Espino, PT MPT [id number]      Note: If patient does not return for scheduled/ recommended follow up visits, this note will serve as a discharge from care along with most recent update on progress.

## 2020-12-15 ENCOUNTER — HOSPITAL ENCOUNTER (OUTPATIENT)
Dept: PHYSICAL THERAPY | Age: 80
Setting detail: THERAPIES SERIES
Discharge: HOME | End: 2020-12-15
Payer: MEDICARE

## 2020-12-15 PROCEDURE — 97035 APP MDLTY 1+ULTRASOUND EA 15: CPT | Performed by: PHYSICAL THERAPIST

## 2020-12-15 PROCEDURE — 97140 MANUAL THERAPY 1/> REGIONS: CPT | Performed by: PHYSICAL THERAPIST

## 2020-12-15 PROCEDURE — 97110 THERAPEUTIC EXERCISES: CPT | Performed by: PHYSICAL THERAPIST

## 2020-12-15 NOTE — FLOWSHEET NOTE
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 Date Range   7 [place] []  Yes  []  No      Latex Allergy:  [x]NO      []YES  Preferred Language for Healthcare:   [x]English       []other:    Functional Scale:        Date assessed:  NDI: raw score = 15; dysfunction =30 %   20    Pain level:1-2 /10     History:  Patient stated complaint:Patient was diagnosed a few years ago with cx dystonia. She was seen here for therapy in 2019. Pt recently had Botox injections in the L SCM and was referred to PT. Her main c/o is difficulty with cx flexion and mm guarding in her neck. She reports \"her head to heavy to hold up\".      SUBJECTIVE: Pt report less scap mm guarding this date. She feels PT has made a difference in these mm. Pt reports sleep disturbance last night. OBJECTIVE: See eval  ? Observation: 12/15: less scap mm guarding noted this date B  ?  Test measurements:      RESTRICTIONS/PRECAUTIONS:     Exercises/Interventions:   Therapeutic Exercise (66998) Resistance / level Sets/sec Reps Notes Ball on wall - cx  Chin nod  cx rotation  B ER  Bicep curls  TB diagnonals  TB pullapart       3#  3#  orange  orange    10  10x L/R  15  10  10 L/R  10                       Stretches:  UT,LS, SCM  Scalene  Doorway pect str      20\"  30\"       2B  2 HEP  Reviewed technique   TB row  TB B ER  TB B shldr ext Cecelia Chum  Green  10  10  add HEP   shldr shrugs 2#  15 HEP   Therapeutic Activities (83314)       CC: Mid row   12.5#   2   15           Wall pushup   10                         NMR re-education (83547)                                          Manual Intervention (88873)       STM to B CPVM / R>L  MT w & wo hawkgrips  8' Pt seated with gown   Cx mobs   2x10 Pt supine   Manual cx traction  Manual cx str: UT, LS,SCM   3'  3\" Pt supine  Head of Table into extension for SCM str   K-tape to B UT/MT     Hold this date d/t skin irriated after taken off                     Patient education:12/1: Reviewed care of the tape with the patient and patient knows to remove tape if pain increases or if she notices a skin sensitivity or allergy to the tape. Patient verbalized understanding of all instructions. -pt educated on diagnosis, prognosis and expectations for rehab  -all pt questions were answered    Home exercise program:  Access Code: [email or web address]   URL: [email or web address]   Date: 12/10/2020   Prepared by: Enrrique Stallings     Exercises   Seated Scalenes Stretch - 2-3 reps - 15 hold - 1x daily - 7x weekly   Doorway Pec Stretch at 90 Degrees Abduction - 2 reps - 30 hold - 1x daily - 7x weekly       Access Code: [email or web address]   URL: [employer]/   Date: 12/03/2020   Prepared by: Enrrique Stallings     Exercises   Standing Isometric Cervical Flexion with Manual Resistance - 10 reps - 5 hold - 1x daily - 7x weekly   Standing Isometric Cervical Sidebending with Manual Resistance - 10 reps - 5 hold - 1x daily - 7x weekly [x] (72928) Reviewed/Progressed HEP activities related to strengthening, flexibility, endurance, ROM of cervical, scapular, scapulothoracic and UE control with self care, reaching, carrying, lifting, house/yardwork, driving/computer work  [] (63514) Reviewed/Progressed HEP activities related to improving balance, coordination, kinesthetic sense, posture, motor skill, proprioception of cervical, scapular, scapulothoracic and UE control with self care, reaching, carrying, lifting, house/yardwork, driving/computer work      Manual Treatments:  PROM / STM / Oscillations-Mobs:  G-I, II, III, IV (PA's, Inf., Post.)  [x] (03264) Provided manual therapy to mobilize soft tissue/joints of cervical/CT, scapular GHJ and UE for the purpose of decreasing headache, modulating pain, promoting relaxation,  increasing ROM, reducing/eliminating soft tissue swelling/inflammation/restriction, improving soft tissue extensibility and allowing for proper ROM for normal function with self care, reaching, carrying, lifting, house/yardwork, driving/computer work    Modalities:  12/15: US @ 100% 1.5w/cm to B Cx and scap regions with biofreeze x 8min, MH x 15 min  ( 2 cx sized to neck and scap) - supine with wedge    Charges:  Timed Code Treatment Minutes: 45   Total Treatment Minutes: 60       [] EVAL - LOW (67003)   [] EVAL - MOD (50510)  [] EVAL - HIGH (24492)  [] RE-EVAL (88452)  [x] TE (31074) x1      [] Ionto (68323)  [] NMR (26023) x      [] Vaso (42839)  [x] Manual (36743) x 1     [x] Ultrasound  [] TA (09746) x      [] Mech Traction (05800)  [] Gait Training x     [] ES (un) (59447):   [] Aquatic therapy x   [] Other:   [] Group:     GOALS:  Patient stated goal: increase ROM and decrease pain of neck  [x]? Progressing: []? Met: []? Not Met: []? Adjusted     Therapist goals for Patient:   Short Term Goals: To be achieved in: 2 weeks  1. Independent in HEP and progression per patient tolerance, in order to prevent re-injury. [x]? Progressing: []? Met: []? Not Met: []? Adjusted  2. Patient will have a decrease in pain to facilitate improvement in movement, function, and ADLs as indicated by Functional Deficits. [x]? Progressing: []? Met: []? Not Met: []? Adjusted     Long Term Goals: To be achieved in: 4-6 weeks  1. Disability index score of 20% or less for the NDI to assist with reaching prior level of function. [x]? Progressing: []? Met: []? Not Met: []? Adjusted  2. Patient will demonstrate increased AROM to [facility] of cervical/thoracic spine to allow for proper joint functioning as indicated by patients Functional Deficits. [x]? Progressing: []? Met: []? Not Met: []? Adjusted  3. Patient will demonstrate an increase in postural awareness and control and activation of  Deep cervical stabilizers to allow for proper functional mobility as indicated by patients Functional Deficits. [x]? Progressing: []? Met: []? Not Met: []? Adjusted  4. Patient will return to functional activities including forward flexion her neck without increased symptoms or restriction. [x]? Progressing: []? Met: []? Not Met: []? Adjusted  5. Patient will report 50% improvement in the heavy to hold up her head feeling   [x]? Progressing: []? Met: []? Not Met: []? Adjusted         Overall Progression Towards Functional goals/ Treatment Progress Update:  [] Patient is progressing as expected towards functional goals listed. [x] Progression is slowed due to complexities/Impairments listed. [] Progression has been slowed due to co-morbidities.   [] Plan just implemented, too soon to assess goals progression <30days   [] Goals require adjustment due to lack of progress  [] Patient is not progressing as expected and requires additional follow up with physician  [] Other    Persisting Functional Limitations/Impairments:  []Sitting []Standing   []Walking []Squatting/bending    []Stairs [x]ADL's    [x]Transfers []Reaching  []Housework [x]Lifting []Driving []Job related tasks  []Sports/Recreation  []Sleeping  []Other:    ASSESSMENT:  Pt has overall less mm guarding today. Tx cont'ed with manual, US and HP to assist with mm guarding. Pt has noted functional improvement since starting PT. Treatment/Activity Tolerance:  [x] Patient able to complete tx  [] Patient limited by fatique  [] Patient limited by pain  [] Patient limited by other medical complications  [] Other:     Prognosis: [x] Good [] Fair  [] Poor    Patient Requires Follow-up: [x] Yes  [] No    Plan for next treatment session: cont manual, progress scapular strengthening    PLAN: See eval. PT 2x / week for 4-6 weeks. [x] Continue per plan of care [] Alter current plan (see comments)  [] Plan of care initiated [] Hold pending MD visit [] Discharge    Electronically signed by: Foster Castorena, PT MPT [id number]      Note: If patient does not return for scheduled/ recommended follow up visits, this note will serve as a discharge from care along with most recent update on progress.

## 2020-12-17 ENCOUNTER — HOSPITAL ENCOUNTER (OUTPATIENT)
Dept: PHYSICAL THERAPY | Age: 80
Setting detail: THERAPIES SERIES
Discharge: HOME | End: 2020-12-17
Payer: MEDICARE

## 2020-12-17 NOTE — PROGRESS NOTES
[facility]    [facility] Therapy  Cancellation/No-show Note  Patient Name:  Vinod Lan  :  1940   Date:  2020    Cancelled visits to date: 1  No-shows to date: 0    For today's appointment patient:  [x]  Cancelled   []  Rescheduled appointment  []  No-show     Reason given by patient:  []  Patient ill  [x]  Conflicting appointment  []  No transportation    []  Conflict with work  []  No reason given  []  Other:     Comments:      Phone call information:   []  Phone call made today to patient at _ time at number provided:      []  Patient answered, conversation as follows:    []  Patient did not answer, message left as follows:  []  Phone call not made today  [x]  Phone call not needed - pt contacted us to cancel and provided reason for cancellation.      Electronically signed by:  Elie Miranda PT

## 2021-01-05 ENCOUNTER — HOSPITAL ENCOUNTER (OUTPATIENT)
Dept: PHYSICAL THERAPY | Age: 81
Setting detail: THERAPIES SERIES
Discharge: HOME | End: 2021-01-05
Payer: MEDICARE

## 2021-01-05 PROCEDURE — 97110 THERAPEUTIC EXERCISES: CPT | Performed by: PHYSICAL THERAPIST

## 2021-01-05 PROCEDURE — 97140 MANUAL THERAPY 1/> REGIONS: CPT | Performed by: PHYSICAL THERAPIST

## 2021-01-05 PROCEDURE — 97035 APP MDLTY 1+ULTRASOUND EA 15: CPT | Performed by: PHYSICAL THERAPIST

## 2021-01-05 NOTE — FLOWSHEET NOTE
[address]  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 Date Range   8 [place] []  Yes  []  No      Latex Allergy:  [x]NO      []YES  Preferred Language for Healthcare:   [x]English       []other:    Functional Scale:        Date assessed:  NDI: raw score = 15; dysfunction =30 %   20    Pain level:1-10     History:  Patient stated complaint:Patient was diagnosed a few years ago with cx dystonia. She was seen here for therapy in 2019. Pt recently had Botox injections in the L SCM and was referred to PT. Her main c/o is difficulty with cx flexion and mm guarding in her neck. She reports \"her head to heavy to hold up\".      SUBJECTIVE: Pt was not seen for 3 weeks d/t out of town and quarantined. She feels PT has made a difference in these mm. Pt reports sleep disturbance last night. OBJECTIVE: See eval  ? Observation: 12/15: less scap mm guarding noted this date B  ?  Test measurements:      RESTRICTIONS/PRECAUTIONS:     Exercises/Interventions:   Therapeutic Exercise (06646) Resistance / level Sets/sec Reps Notes Ball on wall - cx  Chin nod  cx rotation  B ER  Bicep curls  TB diagnonals  TB pullapart       3#  3#  orange  orange    10  10x L/R  20  20  10 L/R  10                       Stretches:  UT,LS, SCM  Scalene  Doorway pect str      20\"  30\"       2B  2 HEP  Reviewed technique   TB row  TB B ER  TB B shldr ext Chantal Juliana  Green  10  10  add HEP   shldr shrugs 2#   HEP   Therapeutic Activities (25939)       CC: Mid row   12.5#   2   15           Wall pushup  2 15                         NMR re-education (42190)                                          Manual Intervention (63394)       STM to B CPVM / R>L  MT w & wo hawkgrips  8' Pt seated with gown   Cx mobs   2x10 Pt supine   Manual cx traction  Manual cx str: UT, LS,SCM   3'  3\" Pt supine  Head of Table into extension for SCM str   K-tape to B UT/MT     Hold this date d/t skin irriated after taken off                     Patient education:12/1: Reviewed care of the tape with the patient and patient knows to remove tape if pain increases or if she notices a skin sensitivity or allergy to the tape. Patient verbalized understanding of all instructions. -pt educated on diagnosis, prognosis and expectations for rehab  -all pt questions were answered    Home exercise program:  Access Code: [email or web address]   URL: [employer]/   Date: 12/10/2020   Prepared by: Glenda Nevarez     Exercises   Seated Scalenes Stretch - 2-3 reps - 15 hold - 1x daily - 7x weekly   Doorway Pec Stretch at 90 Degrees Abduction - 2 reps - 30 hold - 1x daily - 7x weekly       Access Code: [email or web address]   URL: [email or web address]   Date: 12/03/2020   Prepared by: Glenda Nevarez     Exercises   Standing Isometric Cervical Flexion with Manual Resistance - 10 reps - 5 hold - 1x daily - 7x weekly   Standing Isometric Cervical Sidebending with Manual Resistance - 10 reps - 5 hold - 1x daily - 7x weekly Standing Isometric Cervical Extension with Manual Resistance - 10 reps - 5 hold - 1x daily - 7x weekly     Access Code: [email or web address]   URL: [email or web address]   Date: 11/24/2020   Prepared by: Agustin Favre     Exercises   Standing Cervical Sidebending AROM - 3 reps - 20 hold - 1x daily - 7x weekly   Cervical AROM Flexion and Rotation - 3 reps - 20 hold - 1x daily - 7x weekly   Sternocleidomastoid Stretch - 3 reps - 20 hold - 1x daily - 7x weekly   Standing Shoulder Shrugs - 10 reps - 2 sets - 1x daily - 7x weekly   Standing Row with Anchored Resistance - 10 reps - 2 sets - 1x daily - 7x weekly   Shoulder External Rotation and Scapular Retraction with Resistance - 10 reps - 1x daily - 7x weekly     Therapeutic Exercise and NMR EXR  [x] (89109) Provided verbal/tactile cueing for activities related to strengthening, flexibility, endurance, ROM  for improvements in cervical, postural, scapular, scapulothoracic and UE control with self care, reaching, carrying, lifting, house/yardwork, driving/computer work.    [] (60237) Provided verbal/tactile cueing for activities related to improving balance, coordination, kinesthetic sense, posture, motor skill, proprioception  to assist with cervical, scapular, scapulothoracic and UE control with self care, reaching, carrying, lifting, house/yardwork, driving/computer work. Therapeutic Activities:    [] (36545 or 29215) Provided verbal/tactile cueing for activities related to improving balance, coordination, kinesthetic sense, posture, motor skill, proprioception and motor activation to allow for proper function of cervical, scapular, scapulothoracic and UE control with self care, carrying, lifting, driving/computer work.      Home Exercise Program: [x] (70094) Reviewed/Progressed HEP activities related to strengthening, flexibility, endurance, ROM of cervical, scapular, scapulothoracic and UE control with self care, reaching, carrying, lifting, house/yardwork, driving/computer work  [] (42730) Reviewed/Progressed HEP activities related to improving balance, coordination, kinesthetic sense, posture, motor skill, proprioception of cervical, scapular, scapulothoracic and UE control with self care, reaching, carrying, lifting, house/yardwork, driving/computer work      Manual Treatments:  PROM / STM / Oscillations-Mobs:  G-I, II, III, IV (PA's, Inf., Post.)  [x] (28532) Provided manual therapy to mobilize soft tissue/joints of cervical/CT, scapular GHJ and UE for the purpose of decreasing headache, modulating pain, promoting relaxation,  increasing ROM, reducing/eliminating soft tissue swelling/inflammation/restriction, improving soft tissue extensibility and allowing for proper ROM for normal function with self care, reaching, carrying, lifting, house/yardwork, driving/computer work    Modalities:  1/5: US @ 100% 1.5w/cm to B Cx and scap regions with biofreeze x 8min, MH x 15 min  ( 2 cx sized to neck and scap) - supine with wedge    Charges:  Timed Code Treatment Minutes: 45   Total Treatment Minutes: 60       [] EVAL - LOW (66584)   [] EVAL - MOD (60290)  [] EVAL - HIGH (96887)  [] RE-EVAL (45065)  [x] TE (20670) x1      [] Ionto (62098)  [] NMR (20917) x      [] Vaso (77013)  [x] Manual (27020) x 1     [x] Ultrasound  [] TA (46920) x      [] Mech Traction (93597)  [] Gait Training x     [] ES (un) (68298):   [] Aquatic therapy x   [] Other:   [] Group:     GOALS:  Patient stated goal: increase ROM and decrease pain of neck  [x]? Progressing: []? Met: []? Not Met: []? Adjusted     Therapist goals for Patient:   Short Term Goals: To be achieved in: 2 weeks  1. Independent in HEP and progression per patient tolerance, in order to prevent re-injury. [x]? Progressing: []? Met: []? Not Met: []? Adjusted  2. Patient will have a decrease in pain to facilitate improvement in movement, function, and ADLs as indicated by Functional Deficits. [x]? Progressing: []? Met: []? Not Met: []? Adjusted     Long Term Goals: To be achieved in: 4-6 weeks  1. Disability index score of 20% or less for the NDI to assist with reaching prior level of function. [x]? Progressing: []? Met: []? Not Met: []? Adjusted  2. Patient will demonstrate increased AROM to [facility] of cervical/thoracic spine to allow for proper joint functioning as indicated by patients Functional Deficits. [x]? Progressing: []? Met: []? Not Met: []? Adjusted  3. Patient will demonstrate an increase in postural awareness and control and activation of  Deep cervical stabilizers to allow for proper functional mobility as indicated by patients Functional Deficits. [x]? Progressing: []? Met: []? Not Met: []? Adjusted  4. Patient will return to functional activities including forward flexion her neck without increased symptoms or restriction. [x]? Progressing: []? Met: []? Not Met: []? Adjusted  5. Patient will report 50% improvement in the heavy to hold up her head feeling   [x]? Progressing: []? Met: []? Not Met: []? Adjusted         Overall Progression Towards Functional goals/ Treatment Progress Update:  [] Patient is progressing as expected towards functional goals listed. [x] Progression is slowed due to complexities/Impairments listed. [] Progression has been slowed due to co-morbidities.   [] Plan just implemented, too soon to assess goals progression <30days   [] Goals require adjustment due to lack of progress  [] Patient is not progressing as expected and requires additional follow up with physician  [] Other    Persisting Functional Limitations/Impairments:  []Sitting []Standing   []Walking []Squatting/bending    []Stairs [x]ADL's    [x]Transfers []Reaching  []Housework [x]Lifting []Driving []Job related tasks  []Sports/Recreation  []Sleeping  []Other:    ASSESSMENT:  Pt had more mm guarding today, but has not been to PT in 3 weeks. Tx cont'ed with manual, US and HP to assist with mm guarding. Pt has noted functional improvement since starting PT. Will see pt for 1 more week then DC d/t her leaving for [place]. Treatment/Activity Tolerance:  [x] Patient able to complete tx  [] Patient limited by fatique  [] Patient limited by pain  [] Patient limited by other medical complications  [] Other:     Prognosis: [x] Good [] Fair  [] Poor    Patient Requires Follow-up: [x] Yes  [] No    Plan for next treatment session: REEVAL    PLAN: See eval. PT 2x / week for 4-6 weeks. [x] Continue per plan of care [] Alter current plan (see comments)  [] Plan of care initiated [] Hold pending MD visit [] Discharge    Electronically signed by: Lizeth Hernandez, PT MPT [id number]      Note: If patient does not return for scheduled/ recommended follow up visits, this note will serve as a discharge from care along with most recent update on progress.

## 2021-01-07 ENCOUNTER — OFFICE VISIT (OUTPATIENT)
Dept: ENT CLINIC | Age: 81
End: 2021-01-07
Payer: MEDICARE

## 2021-01-07 ENCOUNTER — HOSPITAL ENCOUNTER (OUTPATIENT)
Dept: PHYSICAL THERAPY | Age: 81
Setting detail: THERAPIES SERIES
Discharge: HOME | End: 2021-01-07
Payer: MEDICARE

## 2021-01-07 VITALS — SYSTOLIC BLOOD PRESSURE: 124 MMHG | DIASTOLIC BLOOD PRESSURE: 86 MMHG | TEMPERATURE: 96.8 F

## 2021-01-07 DIAGNOSIS — H61.23 BILATERAL IMPACTED CERUMEN: ICD-10-CM

## 2021-01-07 DIAGNOSIS — R13.10 DYSPHAGIA, UNSPECIFIED TYPE: Primary | ICD-10-CM

## 2021-01-07 PROCEDURE — 1036F TOBACCO NON-USER: CPT | Performed by: OTOLARYNGOLOGY

## 2021-01-07 PROCEDURE — 97110 THERAPEUTIC EXERCISES: CPT | Performed by: PHYSICAL THERAPIST

## 2021-01-07 PROCEDURE — 1123F ACP DISCUSS/DSCN MKR DOCD: CPT | Performed by: OTOLARYNGOLOGY

## 2021-01-07 PROCEDURE — 97035 APP MDLTY 1+ULTRASOUND EA 15: CPT | Performed by: PHYSICAL THERAPIST

## 2021-01-07 PROCEDURE — G8399 PT W/DXA RESULTS DOCUMENT: HCPCS | Performed by: OTOLARYNGOLOGY

## 2021-01-07 PROCEDURE — 99213 OFFICE O/P EST LOW 20 MIN: CPT | Performed by: OTOLARYNGOLOGY

## 2021-01-07 PROCEDURE — G8421 BMI NOT CALCULATED: HCPCS | Performed by: OTOLARYNGOLOGY

## 2021-01-07 PROCEDURE — 97140 MANUAL THERAPY 1/> REGIONS: CPT | Performed by: PHYSICAL THERAPIST

## 2021-01-07 PROCEDURE — 1090F PRES/ABSN URINE INCON ASSESS: CPT | Performed by: OTOLARYNGOLOGY

## 2021-01-07 PROCEDURE — G8484 FLU IMMUNIZE NO ADMIN: HCPCS | Performed by: OTOLARYNGOLOGY

## 2021-01-07 PROCEDURE — G8427 DOCREV CUR MEDS BY ELIG CLIN: HCPCS | Performed by: OTOLARYNGOLOGY

## 2021-01-07 PROCEDURE — 4040F PNEUMOC VAC/ADMIN/RCVD: CPT | Performed by: OTOLARYNGOLOGY

## 2021-01-07 RX ORDER — ZINC 25 MG
TABLET ORAL
COMMUNITY

## 2021-01-07 RX ORDER — FLECAINIDE ACETATE 50 MG/1
TABLET ORAL
COMMUNITY
Start: 2020-08-20

## 2021-01-07 ASSESSMENT — ENCOUNTER SYMPTOMS
RHINORRHEA: 0
TROUBLE SWALLOWING: 1
SINUS PRESSURE: 0
VOICE CHANGE: 0
SORE THROAT: 0
SINUS PAIN: 0

## 2021-01-07 NOTE — PROGRESS NOTES
[facility]       PCP:  Jeff Johnson MD      CHIEF COMPLAINT:  Chief Complaint   Patient presents with    Hearing Loss    Dysphagia       HISTORY OF PRESENT ILLNESS:       Jatin Singh is a [de-identified] y.o. female. She stated that her hearing seems decreased on the left  and on the right side it feels like plugged with wax. She has a hearing test on Friday. She is going to [place] on Tuesday. She reported trouble swallowing food and pills. She has cervical dystonia and had a botox shot on November 17th. She developed \"difficulty swallowing food is still there and I have to drink water to get it to go down. \"        REVIEW OF SYSTEMS:    Review of Systems   Constitutional: Negative for chills and fever. HENT: Positive for hearing loss and trouble swallowing. Negative for ear discharge, ear pain, rhinorrhea, sinus pressure, sinus pain, sore throat and voice change. PAST MEDICAL HISTORY:    Past Medical History:   Diagnosis Date    Benign microscopic hematuria 1970    Primary osteoarthritis of left knee 2/26/2019         Past Surgical History:   Procedure Laterality Date    BREAST SURGERY           EXAMINATION:      Vitals:    01/07/21 0856   BP: 124/86   Temp: 96.8 °F (36 °C)         Physical Exam  Vitals signs reviewed. Constitutional:       General: She is not in acute distress. Appearance: Normal appearance. She is well-developed. She is not ill-appearing or toxic-appearing. HENT:      Head: Normocephalic and atraumatic. Salivary Glands: Right salivary gland is not diffusely enlarged or tender. Left salivary gland is not diffusely enlarged or tender. Right Ear: There is impacted cerumen. Left Ear: There is impacted cerumen. Nose: Nose normal. No nasal deformity, septal deviation (Normal bilateral nasal turbinates ), mucosal edema (Normal nasal mucosa), congestion or rhinorrhea. Right Turbinates: Not enlarged. Left Turbinates: Not enlarged. Right Sinus: No maxillary sinus tenderness or frontal sinus tenderness. Left Sinus: No maxillary sinus tenderness or frontal sinus tenderness. Mouth/Throat:      Lips: Pink. No lesions. Mouth: Mucous membranes are moist. No oral lesions. Tongue: No lesions. Palate: No mass and lesions. Pharynx: Oropharynx is clear. Uvula midline. No oropharyngeal exudate or posterior oropharyngeal erythema. Tonsils: No tonsillar exudate or tonsillar abscesses. Neck:      Musculoskeletal: Normal range of motion and neck supple. No neck rigidity or muscular tenderness. Thyroid: No thyroid mass, thyromegaly or thyroid tenderness. Lymphadenopathy:      Head:      Right side of head: No submental, submandibular or tonsillar adenopathy. Left side of head: No submental, submandibular or tonsillar adenopathy. Cervical: No cervical adenopathy. Upper Body:      Right upper body: No supraclavicular adenopathy. Left upper body: No supraclavicular adenopathy. Neurological:      Mental Status: She is alert. PROCEDURE - REMOVAL OF CERUMEN IMPACTION (19780 bilateral):  Cerumen impaction partially occluding both of the EACs, and obscuring visualization of the TMs, was removed under otomicroscopic visualization, with instrumentation, using a Billeau wire loop and suction. The EACs appeared to be normal.  The tympanic membranes appeared to be normal.  Garcia Ya reported improved hearing, back to usual normal level, after cerumen removal.        IMPRESSION / DIAGNOSES / ORDERS:       Garcia Ya was seen today for hearing loss and dysphagia. Diagnoses and all orders for this visit:    Dysphagia, unspecified type  -     [place] MODIFIED BARIUM SWALLOW W VIDEO    Bilateral impacted cerumen         RECOMMENDATIONS/PLAN:      1. Call for results of barium swallow. 2. Return if symptoms worsen or, for any further ENT or sinus problems or symptoms.           MEDICAL DECISION MAKING    # and complexity of problems addressed:  68525 - Moderate  1 undiagnosed NEW problem with uncertain prognosis (dysphagia)    Amount and/or Complexity of Data to be Reviewed and Analyzed  00687 - Straightforward   1 test ordered    Risk of Complications and /or Morbidity or Mortality of Patient Management  74571 - Low

## 2021-01-07 NOTE — FLOWSHEET NOTE
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 Date Range   8 [place] []  Yes  []  No      Latex Allergy:  [x]NO      []YES  Preferred Language for Healthcare:   [x]English       []other:    Functional Scale:        Date assessed:  NDI: raw score = 15; dysfunction =30 %   20  NDI: raw score = 3; dysfunction =6 %    21      Pain level:1-2 /10     History:  Patient stated complaint:Patient was diagnosed a few years ago with cx dystonia. She was seen here for therapy in 2019. Pt recently had Botox injections in the L SCM and was referred to PT. Her main c/o is difficulty with cx flexion and mm guarding in her neck. She reports \"her head to heavy to hold up\".      SUBJECTIVE:  She feels PT has made a difference in these mm, at least 60% improved. Pt reports overall less sleep disturbance and can now flex head with ease. She is wondering when the Botox will wear off. OBJECTIVE: See eval  ? Observation: 12/15: less scap mm guarding noted this date B  ?  Test measurements:      RESTRICTIONS/PRECAUTIONS: Exercises/Interventions:   Therapeutic Exercise (38189) Resistance / level Sets/sec Reps Notes                 Ball on wall - cx  Chin nod  cx rotation  B ER  Bicep curls  TB diagnonals  TB pullapart       3#  3#  orange  orange    10  10x L/R  20  20  10 L/R  10                       Stretches:  UT,LS, SCM  Scalene  Doorway pect str      20\"  30\"       2B  2 HEP  Reviewed technique   TB row  TB B ER  TB B shldr ext Hildred Diamond  Green  10  10  add HEP   shldr shrugs 2#   HEP   Therapeutic Activities (79685)       CC: Mid row   12.5#   2   15           Wall pushup  2 15                         NMR re-education (77843)                                          Manual Intervention (39000)       STM to B CPVM / R>L  MT w & wo hawkgrips  8' Pt seated with gown   Cx mobs   2x10 Pt supine   Manual cx traction  Manual cx str: UT, LS,SCM   3'  3\" Pt supine  Head of Table into extension for SCM str   K-tape to B UT/MT     Hold this date d/t skin irriated after taken off                     Patient education:12/1: Reviewed care of the tape with the patient and patient knows to remove tape if pain increases or if she notices a skin sensitivity or allergy to the tape. Patient verbalized understanding of all instructions. -pt educated on diagnosis, prognosis and expectations for rehab  -all pt questions were answered    Home exercise program:  Access Code: [email or web address]   URL: [employer]/   Date: 12/10/2020   Prepared by: Gabi Mealy     Exercises   Seated Scalenes Stretch - 2-3 reps - 15 hold - 1x daily - 7x weekly   Doorway Pec Stretch at 90 Degrees Abduction - 2 reps - 30 hold - 1x daily - 7x weekly       Access Code: [email or web address]   URL: [employer]/   Date: 12/03/2020   Prepared by: Gabi Mealy     Exercises   Standing Isometric Cervical Flexion with Manual Resistance - 10 reps - 5 hold - 1x daily - 7x weekly Standing Isometric Cervical Sidebending with Manual Resistance - 10 reps - 5 hold - 1x daily - 7x weekly   Standing Isometric Cervical Extension with Manual Resistance - 10 reps - 5 hold - 1x daily - 7x weekly     Access Code: [email or web address]   URL: [email or web address]   Date: 11/24/2020   Prepared by: Romeo Chris     Exercises   Standing Cervical Sidebending AROM - 3 reps - 20 hold - 1x daily - 7x weekly   Cervical AROM Flexion and Rotation - 3 reps - 20 hold - 1x daily - 7x weekly   Sternocleidomastoid Stretch - 3 reps - 20 hold - 1x daily - 7x weekly   Standing Shoulder Shrugs - 10 reps - 2 sets - 1x daily - 7x weekly   Standing Row with Anchored Resistance - 10 reps - 2 sets - 1x daily - 7x weekly   Shoulder External Rotation and Scapular Retraction with Resistance - 10 reps - 1x daily - 7x weekly     Therapeutic Exercise and NMR EXR  [x] (15742) Provided verbal/tactile cueing for activities related to strengthening, flexibility, endurance, ROM  for improvements in cervical, postural, scapular, scapulothoracic and UE control with self care, reaching, carrying, lifting, house/yardwork, driving/computer work.    [] (00708) Provided verbal/tactile cueing for activities related to improving balance, coordination, kinesthetic sense, posture, motor skill, proprioception  to assist with cervical, scapular, scapulothoracic and UE control with self care, reaching, carrying, lifting, house/yardwork, driving/computer work. Therapeutic Activities:    [] (20581 or 38112) Provided verbal/tactile cueing for activities related to improving balance, coordination, kinesthetic sense, posture, motor skill, proprioception and motor activation to allow for proper function of cervical, scapular, scapulothoracic and UE control with self care, carrying, lifting, driving/computer work.      Home Exercise Program: [x] (65565) Reviewed/Progressed HEP activities related to strengthening, flexibility, endurance, ROM of cervical, scapular, scapulothoracic and UE control with self care, reaching, carrying, lifting, house/yardwork, driving/computer work  [] (88562) Reviewed/Progressed HEP activities related to improving balance, coordination, kinesthetic sense, posture, motor skill, proprioception of cervical, scapular, scapulothoracic and UE control with self care, reaching, carrying, lifting, house/yardwork, driving/computer work      Manual Treatments:  PROM / STM / Oscillations-Mobs:  G-I, II, III, IV (PA's, Inf., Post.)  [x] (29400) Provided manual therapy to mobilize soft tissue/joints of cervical/CT, scapular GHJ and UE for the purpose of decreasing headache, modulating pain, promoting relaxation,  increasing ROM, reducing/eliminating soft tissue swelling/inflammation/restriction, improving soft tissue extensibility and allowing for proper ROM for normal function with self care, reaching, carrying, lifting, house/yardwork, driving/computer work    Modalities:  1/7: US @ 100% 1.5w/cm to B Cx and scap regions with biofreeze x 8min, MH x 15 min  ( 2 cx sized to neck and scap) - supine with wedge    Charges:  Timed Code Treatment Minutes: 45   Total Treatment Minutes: 60       [] EVAL - LOW (48691)   [] EVAL - MOD (72084)  [] EVAL - HIGH (46354)  [] RE-EVAL (22043)  [x] TE (80687) x1      [] Ionto (82644)  [] NMR (39852) x      [] Vaso (05464)  [x] Manual (24483) x 1     [x] Ultrasound  [] TA (53893) x      [] Mech Traction (49688)  [] Gait Training x     [] ES (un) (16416):   [] Aquatic therapy x   [] Other:   [] Group:     GOALS:  Patient stated goal: increase ROM and decrease pain of neck  []? Progressing: [x]? Met: []? Not Met: []? Adjusted     Therapist goals for Patient:   Short Term Goals: To be achieved in: 2 weeks  1. Independent in HEP and progression per patient tolerance, in order to prevent re-injury.

## 2021-01-08 ENCOUNTER — PROCEDURE VISIT (OUTPATIENT)
Dept: AUDIOLOGY | Age: 81
End: 2021-01-08
Payer: MEDICARE

## 2021-01-08 DIAGNOSIS — H90.3 SENSORINEURAL HEARING LOSS (SNHL) OF BOTH EARS: Primary | ICD-10-CM

## 2021-01-08 PROCEDURE — 92557 COMPREHENSIVE HEARING TEST: CPT | Performed by: AUDIOLOGIST

## 2021-01-08 PROCEDURE — 92567 TYMPANOMETRY: CPT | Performed by: AUDIOLOGIST

## 2021-01-08 NOTE — PROGRESS NOTES
[facility]  Division of Audiology/Otolaryngology        PCP: Colleen Roque. Emmett Salazar MD  MRN:     CHIEF COMPLAINT: Hearing Loss      HISTORY OF PRESENT ILLNESS  Hart Fothergill was seen for hearing and middle ear evaluation. Otolaryngology is referral source of today's appointment. Patient presents with some hearing concerns. AUDIOGRAM & IMMITTANCE    Hearing status is mild from both ears. The loss is of sensory nature. Loss is symmetrical. Good speech discernment demonstrated in quiet, at elevated levels. Immittance testing is consistent with normal middle ear status (Type A tympanogram). Some ipsilateral acoustic reflexes present from left ear.                     RECOMMENDATIONS / PLAN:    Return for bi-annual hearing tests

## 2021-01-11 ENCOUNTER — HOSPITAL ENCOUNTER (OUTPATIENT)
Dept: WOMENS IMAGING | Age: 81
Discharge: HOME | End: 2021-01-11
Payer: MEDICARE

## 2021-01-11 DIAGNOSIS — Z12.31 VISIT FOR SCREENING MAMMOGRAM: ICD-10-CM

## 2021-01-11 PROCEDURE — 77063 BREAST TOMOSYNTHESIS BI: CPT

## 2021-11-04 ENCOUNTER — OFFICE VISIT (OUTPATIENT)
Dept: ENT CLINIC | Age: 81
End: 2021-11-04
Payer: MEDICARE

## 2021-11-04 VITALS — HEART RATE: 63 BPM | DIASTOLIC BLOOD PRESSURE: 71 MMHG | TEMPERATURE: 97.1 F | SYSTOLIC BLOOD PRESSURE: 107 MMHG

## 2021-11-04 DIAGNOSIS — H90.0 CONDUCTIVE HEARING LOSS OF BOTH EARS: ICD-10-CM

## 2021-11-04 DIAGNOSIS — R59.0 LYMPHADENOPATHY OF LEFT CERVICAL REGION: Primary | ICD-10-CM

## 2021-11-04 DIAGNOSIS — H61.23 BILATERAL IMPACTED CERUMEN: ICD-10-CM

## 2021-11-04 PROCEDURE — G8427 DOCREV CUR MEDS BY ELIG CLIN: HCPCS | Performed by: OTOLARYNGOLOGY

## 2021-11-04 PROCEDURE — 4040F PNEUMOC VAC/ADMIN/RCVD: CPT | Performed by: OTOLARYNGOLOGY

## 2021-11-04 PROCEDURE — G8421 BMI NOT CALCULATED: HCPCS | Performed by: OTOLARYNGOLOGY

## 2021-11-04 PROCEDURE — 1090F PRES/ABSN URINE INCON ASSESS: CPT | Performed by: OTOLARYNGOLOGY

## 2021-11-04 PROCEDURE — 1123F ACP DISCUSS/DSCN MKR DOCD: CPT | Performed by: OTOLARYNGOLOGY

## 2021-11-04 PROCEDURE — G8399 PT W/DXA RESULTS DOCUMENT: HCPCS | Performed by: OTOLARYNGOLOGY

## 2021-11-04 PROCEDURE — 69210 REMOVE IMPACTED EAR WAX UNI: CPT | Performed by: OTOLARYNGOLOGY

## 2021-11-04 PROCEDURE — 99213 OFFICE O/P EST LOW 20 MIN: CPT | Performed by: OTOLARYNGOLOGY

## 2021-11-04 PROCEDURE — G8484 FLU IMMUNIZE NO ADMIN: HCPCS | Performed by: OTOLARYNGOLOGY

## 2021-11-04 PROCEDURE — 1036F TOBACCO NON-USER: CPT | Performed by: OTOLARYNGOLOGY

## 2021-11-04 ASSESSMENT — ENCOUNTER SYMPTOMS
RHINORRHEA: 0
TROUBLE SWALLOWING: 0
SINUS PAIN: 0
SORE THROAT: 0
VOICE CHANGE: 0

## 2022-01-12 ENCOUNTER — HOSPITAL ENCOUNTER (OUTPATIENT)
Dept: WOMENS IMAGING | Age: 82
Discharge: HOME | End: 2022-01-12
Payer: MEDICARE

## 2022-01-12 DIAGNOSIS — Z12.31 VISIT FOR SCREENING MAMMOGRAM: ICD-10-CM

## 2022-01-12 PROCEDURE — 77063 BREAST TOMOSYNTHESIS BI: CPT

## 2022-06-23 ENCOUNTER — OFFICE VISIT (OUTPATIENT)
Dept: ENT CLINIC | Age: 82
End: 2022-06-23
Payer: MEDICARE

## 2022-06-23 VITALS
HEART RATE: 57 BPM | OXYGEN SATURATION: 97 % | SYSTOLIC BLOOD PRESSURE: 116 MMHG | DIASTOLIC BLOOD PRESSURE: 73 MMHG | TEMPERATURE: 97.5 F

## 2022-06-23 DIAGNOSIS — H61.23 BILATERAL IMPACTED CERUMEN: Primary | ICD-10-CM

## 2022-06-23 DIAGNOSIS — H90.0 CONDUCTIVE HEARING LOSS OF BOTH EARS: ICD-10-CM

## 2022-06-23 DIAGNOSIS — H91.93 BILATERAL HEARING LOSS, UNSPECIFIED HEARING LOSS TYPE: Chronic | ICD-10-CM

## 2022-06-23 PROCEDURE — 69210 REMOVE IMPACTED EAR WAX UNI: CPT | Performed by: OTOLARYNGOLOGY

## 2022-06-23 NOTE — PATIENT INSTRUCTIONS
1. Schedule an audiogram (hearing test), if your hearing is not back to your usual ability, or if hearing loss or tinnitus (ringing or other noise) persists, despite removal of ear wax,.  2. Schedule an appointment for ear recheck and possible cleaning in the future if your hearing is decreased, or you have a sensation of ear wax build up or are told you have ear wax build up by your primary physician or other health care provider. 3. You may use an over the counter ear wax removal kit (such as Murine, Bausch and Lomb, NeilMed, or Debrox wax removal system) for ear wax removal, as needed. 4. It may help to use Debrox (OTC) for 4 days prior to future visits for ear cleaning. This may soften your ear wax and facilitate removal of the wax. NO Q-TIPS OR OTHER INSTRUMENTS/OBJECTS IN THE EARS   You should never clean your ears with a Q-tip, cotton tipped applicator, Lizeth pin, paper clip, safety pin, pen cap, or any other instrument. This will tend to push wax in deeper and pack the ear canal with wax. There is a high risk and danger of this practice, especially rupture of ear drum, dislocation or other damage to ossicles, and permanent, irreversible, and irreparable hearing loss. It may cause inflammation and irritation of the ear canal and cause itching or pain. I recommend only use of one the several ear wax removal kits available \"over the counter\" if you feel a need to try to remove ear wax. For example, Murine, Bausch and Lomb, NeilMed, or Debrox ear wax removal kits may be used for ear wax removal, as needed. No other methods should be self used for cleaning your ears.

## 2022-06-24 ENCOUNTER — PROCEDURE VISIT (OUTPATIENT)
Dept: AUDIOLOGY | Age: 82
End: 2022-06-24
Payer: MEDICARE

## 2022-06-24 DIAGNOSIS — H90.A32 MIXED CONDUCTIVE AND SENSORINEURAL HEARING LOSS OF LEFT EAR WITH RESTRICTED HEARING OF RIGHT EAR: Primary | ICD-10-CM

## 2022-06-24 PROCEDURE — 92567 TYMPANOMETRY: CPT | Performed by: AUDIOLOGIST

## 2022-06-24 PROCEDURE — 92557 COMPREHENSIVE HEARING TEST: CPT | Performed by: AUDIOLOGIST

## 2022-06-24 NOTE — PROGRESS NOTES
[facility]    6/24/2022     Patient name: Kevin Call  Primary Care Physician: Kristi Yousif DO   Medical Record Number: [id number]     Kevin Call   1940, 80 y.o. female   [id number]       Referring Provider: Sam Leon MD  Referral Type: In an order in [address] [place] [address]    Reason for Visit: Evaluation of the cause of disorders of hearing, tinnitus, or balance. ADULT AUDIOLOGIC EVALUATION                    Kevin Call is a 80 y.o. female seen today, 6/24/2022 , for a same-day add-on comprehensive audiologic evaluation. Patient was seen by Sam Leon MD prior to today's evaluation. AUDIOLOGIC AND OTHER PERTINENT MEDICAL HISTORY:      Kevin Call noted diminished hearing over the past few years. She notes much difficulty with clarity of speech while conversing with others. Hearing requires more effort than it has in the past. She is followed by cardiology. IMPRESSIONS:      Today's results are consistent with bilateral sensorineural hearing loss that is greater in left ear with good to excellent word recognition for both ears, and normal middle ear function. Hearing loss is significant enough to result in difficulty understanding speech in most listening environments. Recommended hearing aid evaluation. She will undergo amplification trial for a couple of weeks and followup with Audiology. Patient to follow medical recommendations per  Sam Leon MD.    ASSESSMENT AND FINDINGS:     Otoscopy revealed: Visible tympanic membrane bilaterally    Reliability: Good   Transducer: Inserts    RIGHT EAR:  Hearing Sensitivity: Mild sensorineural  hearing loss. Speech Recognition Threshold: 35 dB HL  Word Recognition: Good (80-89%), based on NU-6   Tympanometry: Normal peak pressure and compliance, Type A tympanogram, consistent with normal middle ear function.   Acoustic Reflexes: Ipsilateral: Absent      LEFT EAR:  Hearing Sensitivity: Mild to moderate mixed hearing loss. Speech Recognition Threshold: 30 dB HL  Word Recognition: Excellent (%), based on NU-6   Tympanometry: Normal peak pressure and compliance, Type A tympanogram, consistent with normal middle ear function. Acoustic Reflexes: Ipsilateral: Present at normal sensation levels, Present at elevated sensation levels and Absent at isolated frequencies. COUNSELING:      Reviewed purpose of testing completed today, general auditory system function, and results obtained today. The following items are recommended based on patient report and results from today's appointment:   - Continue medical follow-up with Moreno Mahan MD.   - Retest hearing as medically indicated and/or sooner if a change in hearing is noted. - If desired, schedule a Hearing Aid Evaluation (HAE) appointment to discuss hearing aid options. Chart CC'd to: Moreno Mahan MD      Degree of   Hearing Sensitivity dB Range   Within Normal Limits (WNL) 0 - 20   Mild 20 - 40   Moderate 40 - 55   Moderately-Severe 55 - 70   Severe 70 - 90   Profound 90 +        RECOMMENDATIONS:      Patient has expressed interest in amplification trial. We will pursue this. Moreno Mahan MD to medically advise.     Margareth Muller  Audiologist    Electronically signed by Margareth Muller on 6/24/22 at 1:43 PM.

## 2022-07-01 ENCOUNTER — OFFICE VISIT (OUTPATIENT)
Dept: AUDIOLOGY | Age: 82
End: 2022-07-01

## 2022-07-01 DIAGNOSIS — H90.3 SENSORINEURAL HEARING LOSS (SNHL) OF BOTH EARS: Primary | ICD-10-CM

## 2022-07-01 PROCEDURE — 99999 PR OFFICE/OUTPT VISIT,PROCEDURE ONLY: CPT | Performed by: AUDIOLOGIST

## 2022-07-01 NOTE — PROGRESS NOTES
[facility]  Division of Audiology/Otolaryngology    2022     PCP: Danny Gaytan DO   MRN: [id number]          HEARING AID FITTING        RIGHT EAR: /MODEL: Oticon More 1 ROBIN  SN: [id number]  WIRE/DOME: Mx1/ S Vented    LEFT EAR: /MODEL:Oticon More 1 ROBIN    SN: [id number]  WIRE/DOME: Mx1 65G / open bass     ACCESSORIES:  HAF: 2022  WARRANTY:   TRIAL PERIOD ENDS:2022  L&D ELIGIBLE: Yes     BUTTONS: DISABLED  PROGRAMS: DISABLED  PHONE CONNECTIVITY: Goran Kang was seen today, for fitting of Oticon More ROBIN devices. Hearing aids were programmed to 80%. Fit is good      RECHARGEABLE Device orientation was completed, includin. Hearing aid insertion/removal   2. Buttons/Indicators   3. Rechargeable battery use and life expectancy               4.  insertion/removal               5. Cleaning/maintenance of the device               6. Loss & Damage/Repair warranty information   7. 30-day right-to-return period        Cherri will undergo 30 day trial of devices. She will be going to [facility] during the trial and not expected back until October. She and I will stay in communication during this time. She has opted sending devices back when 30 day trial is complete. It was recommended that patient return for a follow-up appointment within the 30-day right-to-return period for further programming adjustments and education of the devices as warranted. PATIENT EDUCATION:         Cherri was provided with the Hearing Aid Fitting Checklist as a reference of items discussed at today's appointment. Patient may find additional product information in the provided hearing aid  device manual.     Hearing aid loaner agreement is filed in media. RECOMMENDATIONS:      · Return for follow-up appointment within 30-day right-to-return period.   · HAC in 2-3 weeks, will discuss further details on maintenance and upkeep of device at that time.         Margareth Moore  Audiologist

## 2022-07-11 ENCOUNTER — CLINICAL DOCUMENTATION (OUTPATIENT)
Dept: AUDIOLOGY | Age: 82
End: 2022-07-11

## 2022-07-11 NOTE — PROGRESS NOTES
Courtesy call to patient for hearing aid check. Left patient vm to call our office should she have any questions concerns. She is trailing hearing aids, will be in [place] until October.

## 2022-09-13 ENCOUNTER — HOSPITAL ENCOUNTER (OUTPATIENT)
Age: 82
Discharge: HOME | End: 2022-09-13
Payer: MEDICARE

## 2022-09-13 ENCOUNTER — HOSPITAL ENCOUNTER (OUTPATIENT)
Dept: GENERAL RADIOLOGY | Age: 82
Discharge: HOME | End: 2022-09-13
Payer: MEDICARE

## 2022-09-13 DIAGNOSIS — M54.16 LUMBAR RADICULOPATHY: ICD-10-CM

## 2022-09-13 PROCEDURE — 72100 X-RAY EXAM L-S SPINE 2/3 VWS: CPT

## 2022-11-29 ENCOUNTER — HOSPITAL ENCOUNTER (OUTPATIENT)
Dept: MRI IMAGING | Age: 82
Discharge: HOME | End: 2022-11-29
Payer: MEDICARE

## 2022-11-29 DIAGNOSIS — M54.16 RADICULOPATHY, LUMBAR REGION: ICD-10-CM

## 2022-11-29 PROCEDURE — 72148 MRI LUMBAR SPINE W/O DYE: CPT

## 2022-12-07 ENCOUNTER — OFFICE VISIT (OUTPATIENT)
Dept: ENT CLINIC | Age: 82
End: 2022-12-07
Payer: MEDICARE

## 2022-12-07 VITALS
WEIGHT: 174 LBS | BODY MASS INDEX: 28.99 KG/M2 | SYSTOLIC BLOOD PRESSURE: 91 MMHG | HEART RATE: 67 BPM | DIASTOLIC BLOOD PRESSURE: 61 MMHG | HEIGHT: 65 IN | TEMPERATURE: 97.5 F | OXYGEN SATURATION: 96 %

## 2022-12-07 DIAGNOSIS — H61.23 BILATERAL IMPACTED CERUMEN: Primary | ICD-10-CM

## 2022-12-07 DIAGNOSIS — H90.0 CONDUCTIVE HEARING LOSS OF BOTH EARS: ICD-10-CM

## 2022-12-07 PROCEDURE — 69210 REMOVE IMPACTED EAR WAX UNI: CPT | Performed by: OTOLARYNGOLOGY

## 2022-12-07 NOTE — PROGRESS NOTES
Chief Complaint   Patient presents with    Cerumen Impaction    Hearing Loss       HISTORY:    Osbaldo Vazquez stated that the hearing is decreased in both ears, which are plugged up with ear wax. EXAMINATION:    Both external ear canals were occluded by cerumen impaction, obscuring visualization of the TMs. PROCEDURE - REMOVAL OF BILATERAL CERUMEN IMPACTION:   The cerumen impaction was removed from both of the EACs, under otomicroscopy visualization, with instrumentation, using a Billeau wire loop. After successful cerumen removal, the EACs appeared to be normal and clear bilaterally without mass, exudate, or edema. The tympanic membranes appeared to be normal, including normal pneumatic mobility bilaterally. There was no evidence of acute disease. Osbaldo Vazquez reported improved hearing, back to usual level, after cerumen removal.           IMPRESSION / Vamsi Vasques / Sanaz Schwarz / PROCEDURES:       Osbaldo Vazquez was seen today for cerumen impaction and hearing loss. Diagnoses and all orders for this visit:    Bilateral impacted cerumen    Conductive hearing loss of both ears      RECOMMENDATIONS / PLAN:   See Patient Instructions on file for this visit. Return for recurrence of symptoms of excessive ear wax, or any further ear nose throat or sinus problems.

## 2022-12-07 NOTE — PATIENT INSTRUCTIONS
Schedule an audiogram (hearing test), for evaluation of your hearing loss. Proceed with hearing aids. Schedule an appointment for ear recheck and possible cleaning in the future if your hearing is decreased, or you have a sensation of ear wax build up or are told you have ear wax build up by your primary physician or other health care provider. You may use an over the counter ear wax removal kit (such as Murine, Bausch and Lomb, NeilMed, or Debrox wax removal system) for ear wax removal, as needed. It may help to use Debrox (OTC) for 4 days prior to future visits for ear cleaning. This may soften your ear wax and facilitate removal of the wax. NO Q-TIPS OR OTHER INSTRUMENTS/OBJECTS IN THE EARS   You should never clean your ears with a Q-tip, cotton tipped applicator, Lizeth pin, paper clip, safety pin, pen cap, or any other instrument. This will tend to push wax in deeper and pack the ear canal with wax. There is a high risk and danger of this practice, especially rupture of ear drum, dislocation or other damage to ossicles, and permanent, irreversible, and irreparable hearing loss. It may cause inflammation and irritation of the ear canal and cause itching or pain. I recommend only use of one the several ear wax removal kits available \"over the counter\" if you feel a need to try to remove ear wax. For example, Murine, Bausch and Lomb, NeilMed, or Debrox ear wax removal kits may be used for ear wax removal, as needed. No other methods should be self used for cleaning your ears.

## 2023-01-13 ENCOUNTER — HOSPITAL ENCOUNTER (OUTPATIENT)
Dept: GENERAL RADIOLOGY | Age: 83
Discharge: HOME | End: 2023-01-13
Payer: MEDICARE

## 2023-01-13 DIAGNOSIS — M81.0 AGE-RELATED OSTEOPOROSIS WITHOUT CURRENT PATHOLOGICAL FRACTURE: ICD-10-CM

## 2023-01-13 PROCEDURE — 77080 DXA BONE DENSITY AXIAL: CPT

## 2023-07-31 ENCOUNTER — OFFICE VISIT (OUTPATIENT)
Dept: ENT CLINIC | Age: 83
End: 2023-07-31
Payer: MEDICARE

## 2023-07-31 VITALS
BODY MASS INDEX: 28.82 KG/M2 | WEIGHT: 173 LBS | DIASTOLIC BLOOD PRESSURE: 78 MMHG | TEMPERATURE: 97.8 F | HEIGHT: 65 IN | OXYGEN SATURATION: 97 % | SYSTOLIC BLOOD PRESSURE: 122 MMHG | HEART RATE: 71 BPM

## 2023-07-31 DIAGNOSIS — H90.3 BILATERAL SENSORINEURAL HEARING LOSS: ICD-10-CM

## 2023-07-31 DIAGNOSIS — Q18.1 CYST OF EAR CANAL: ICD-10-CM

## 2023-07-31 DIAGNOSIS — H90.0 CONDUCTIVE HEARING LOSS OF BOTH EARS: ICD-10-CM

## 2023-07-31 DIAGNOSIS — H61.23 BILATERAL IMPACTED CERUMEN: Primary | ICD-10-CM

## 2023-07-31 PROCEDURE — 69210 REMOVE IMPACTED EAR WAX UNI: CPT | Performed by: OTOLARYNGOLOGY

## 2023-07-31 RX ORDER — DIAZEPAM 5 MG/1
5 TABLET ORAL DAILY PRN
COMMUNITY
Start: 2023-06-29

## 2023-07-31 NOTE — PROGRESS NOTES
Chief Complaint   Patient presents with    Ear Problem       HISTORY:    Cristal Amezquita stated that the hearing is decreased in both ears, which are plugged up with ear wax. EXAMINATION:    Both external ear canals were occluded by cerumen impaction, obscuring visualization of the TMs. PROCEDURE - REMOVAL OF BILATERAL CERUMEN IMPACTION:   The cerumen impaction was removed from both of the EACs, under otomicroscopy visualization, with instrumentation, using a Billeau wire loop. After successful cerumen removal, there was a 1 -2 mm cyst in the right external meatus at 11 o'clock. Otherwise, the EACs appeared to be normal and clear bilaterally without mass, exudate, or edema. The tympanic membranes appeared to be normal, including normal pneumatic mobility bilaterally. There was no evidence of acute disease. Cristal Amezquita reported improved hearing, back to usual level, after cerumen removal.          IMPRESSION / Mookie Lam / Miguel Ángel Koenig / PROCEDURES:       Cristal Amezquita was seen today for ear problem. Diagnoses and all orders for this visit:    Bilateral impacted cerumen    Conductive hearing loss of both ears    Cyst of ear canal  Comments:  right     Bilateral sensorineural hearing loss  -     [facility]        RECOMMENDATIONS / PLAN:   Armando Huang and consider hearing aids. Consider excision of the cyst in the right EAC. Return for recurrence of symptoms of excessive ear wax, or any other ear, nose, throat, or sinus problems.

## 2023-08-01 ENCOUNTER — PROCEDURE VISIT (OUTPATIENT)
Dept: AUDIOLOGY | Age: 83
End: 2023-08-01
Payer: MEDICARE

## 2023-08-01 DIAGNOSIS — H90.3 SENSORINEURAL HEARING LOSS, BILATERAL: Primary | ICD-10-CM

## 2023-08-01 PROCEDURE — 92567 TYMPANOMETRY: CPT

## 2023-08-01 PROCEDURE — 92557 COMPREHENSIVE HEARING TEST: CPT

## 2023-08-01 NOTE — PROGRESS NOTES
Almaz Alcocer   1940, 80 y.o. female   [id number]       Referring Provider: Mirta Metzger MD  Referral Type: In an order in [address] [place] [address]    Reason for Visit: Evaluation of suspected change in hearing, tinnitus, or balance. ADULT AUDIOLOGIC EVALUATION      Almaz Alcocer is a 80 y.o. female seen today, 8/1/2023 , for a recheck audiologic evaluation. Patient was seen by Mirta Metzger MD following today's evaluation. AUDIOLOGIC AND OTHER PERTINENT MEDICAL HISTORY:      Yuridia Arriola reports last hearing test was done at [facility] in June 2022. She notes hearing has likely decreased and tends to have cerumen build up. She would like an updated test to take with her to [place] where she resides part time. She previously tried [place] devices last year and did not like them. She will be pursuing devices in [place]. She denied otalgia, aural fullness, otorrhea, tinnitus, dizziness, imbalance, history of head trauma, history of ear surgery, and family history of hearing loss    Date: 8/1/2023     IMPRESSIONS:      Today's results revealed asymmetric sensorineural hearing loss bilaterally, left ear worse than right. Good speech understanding when in quiet. Tympanometry indicates normal middle ear function. Discussed test results and implications with patient. Discussed benefits of amplification pending medical clearance. Discussed noise exposure and the importance of appropriate hearing protection when in hazardous noise environments. Follow medical recommendations of Mirta Metzger MD.     ASSESSMENT AND FINDINGS:     Otoscopy unremarkable. RIGHT EAR:  Hearing Sensitivity: Mild sloping to moderate sensorineural hearing loss   Speech Recognition Threshold: 30 dB HL  Word Recognition: Good 80%, based on NU-6 25-word list at 70 dBHL using recorded speech stimuli. Tympanometry: Normal peak pressure and compliance, Type A tympanogram, consistent with normal middle ear function.        LEFT EAR:  Hearing Sensitivity:

## 2023-08-01 NOTE — PATIENT INSTRUCTIONS
alert you to the doorbell, a ringing telephone, or a baby monitor. Television closed-captioning. This shows the words at the bottom of the screen. Most new TVs can do this. TTY (text telephone). This lets you type messages back and forth on the telephone instead of talking or listening. These devices are also called TDD. When messages are typed on the keyboard, they are sent over the phone line to a receiving TTY. The message is shown on a monitor. Use pagers, fax machines, text, and email if it is hard for you to communicate by telephone. Try to learn a listening technique called speech-reading. It is not lip-reading. You pay attention to people's gestures, expressions, posture, and tone of voice. These clues can help you understand what a person is saying. Face the person you are talking to, and have him or her face you. Make sure the lighting is good. You need to see the other person's face clearly. Think about counseling if you need help to adjust to your hearing loss. When should you call for help? Watch closely for changes in your health, and be sure to contact your doctor if:    You think your hearing is getting worse. You have new symptoms, such as dizziness or nausea.

## 2023-10-26 ENCOUNTER — OFFICE VISIT (OUTPATIENT)
Dept: ENT CLINIC | Age: 83
End: 2023-10-26
Payer: MEDICARE

## 2023-10-26 VITALS
SYSTOLIC BLOOD PRESSURE: 114 MMHG | WEIGHT: 174.2 LBS | HEART RATE: 60 BPM | OXYGEN SATURATION: 96 % | BODY MASS INDEX: 29.02 KG/M2 | TEMPERATURE: 97.1 F | HEIGHT: 65 IN | DIASTOLIC BLOOD PRESSURE: 75 MMHG

## 2023-10-26 DIAGNOSIS — K06.8: ICD-10-CM

## 2023-10-26 DIAGNOSIS — R22.0 MASS OF MANDIBLE: Primary | ICD-10-CM

## 2023-10-26 PROCEDURE — 1036F TOBACCO NON-USER: CPT | Performed by: OTOLARYNGOLOGY

## 2023-10-26 PROCEDURE — 99213 OFFICE O/P EST LOW 20 MIN: CPT | Performed by: OTOLARYNGOLOGY

## 2023-10-26 PROCEDURE — 1090F PRES/ABSN URINE INCON ASSESS: CPT | Performed by: OTOLARYNGOLOGY

## 2023-10-26 PROCEDURE — G8427 DOCREV CUR MEDS BY ELIG CLIN: HCPCS | Performed by: OTOLARYNGOLOGY

## 2023-10-26 PROCEDURE — G8399 PT W/DXA RESULTS DOCUMENT: HCPCS | Performed by: OTOLARYNGOLOGY

## 2023-10-26 PROCEDURE — 1123F ACP DISCUSS/DSCN MKR DOCD: CPT | Performed by: OTOLARYNGOLOGY

## 2023-10-26 PROCEDURE — G8417 CALC BMI ABV UP PARAM F/U: HCPCS | Performed by: OTOLARYNGOLOGY

## 2023-10-26 PROCEDURE — G8484 FLU IMMUNIZE NO ADMIN: HCPCS | Performed by: OTOLARYNGOLOGY

## 2023-10-26 NOTE — PROGRESS NOTES
Chief Complaint   Patient presents with    Mouth Lesions       HISTORY OF PRESENT ILLNESS    Mery Nava is a 80 y.o. female who presented today for evaluation and management for \"an area of the gums, right lower gum, looks like leukoplakia and I feel a ridge. \"   First noticed \"3-4 months ago. \"    Gums seem different \"a total change'\"      REVIEW OF SYSTEMS  Constitutional:  Denied fever and chills. ENT/sinus:  Denied otalgia, otorrhea, nasal pain, rhinorrhea, sore throat, and sinus/facial pain. EXAMINATION    WDWN, NAD  Voice:  Normal.  Ears:  TMs, EACs, mastoids and pinnae were normal.    Nose:  Normal with no lesions. Sinuses:  NT x 4   (+)  Throat,  OC/ OP:  There was a pale area of raised mucosa versus bone prominence on the right lower gingiva/mandible. Otherwise, normal with no lesions. Neck:  NT, No masses. Trachea midline. Nodes:  No lymphadenopathy. Thyroid:  Normal with no goiter, nodules or tenderness. IMPRESSION / Walter Rita / Sandip Singh:   Grisel Amezquita was seen today for mouth lesions. Diagnoses and all orders for this visit:    Mass of mandible  -     CT FACIAL BONES WO CONTRAST    Mass of gingiva  -     CT FACIAL BONES WO CONTRAST         RECOMMENDATIONS / PLAN:   Biopsy after CT scan. Oral surgery consult; patient will consider. Patient agreed to bring in CD disc of the CT scan done June 2023 at [place], [place]. TIME:  I spent a total of at least 20 minutes with this patient for pre-visit review of the medical record, history, physical examination, counseling, discussing and ordering CT scan, reviewing the medical record, and documenting the visit.

## 2023-10-30 ENCOUNTER — TELEPHONE (OUTPATIENT)
Dept: ENT CLINIC | Age: 83
End: 2023-10-30

## 2023-10-30 NOTE — TELEPHONE ENCOUNTER
Pt dropped off disk of her imaging for Dr. Kayce Arce to review. Please call patient after review so patient can pick back up. [phone].

## 2023-11-09 NOTE — TELEPHONE ENCOUNTER
Patient is asking if  review her disk she would like to pick it back up so radiology can look at it as well.

## 2023-11-16 ENCOUNTER — CLINICAL DOCUMENTATION (OUTPATIENT)
Dept: ENT CLINIC | Age: 83
End: 2023-11-16

## 2023-11-16 NOTE — TELEPHONE ENCOUNTER
CD of the CT scan of the head from outside facility, provided by patient, was reviewed, and did not image the mandible. Therefore patient should proceed with the CT scan of facial bones that was ordered previously.

## 2023-11-16 NOTE — PROGRESS NOTES
I reviewed the CD of the CT scan of the head which was provided by the patient and was done at an outside facility. This CT did not go down far enough to image the mandible and was therefore not helpful in evaluating the area of concern. I have ordered a CT scan of the facial bones and the patient should proceed with that study.

## 2024-03-28 ENCOUNTER — OFFICE VISIT (OUTPATIENT)
Dept: ENT CLINIC | Age: 84
End: 2024-03-28

## 2024-03-28 VITALS
RESPIRATION RATE: 16 BRPM | DIASTOLIC BLOOD PRESSURE: 66 MMHG | HEIGHT: 65 IN | WEIGHT: 171 LBS | SYSTOLIC BLOOD PRESSURE: 100 MMHG | TEMPERATURE: 97.8 F | BODY MASS INDEX: 28.49 KG/M2 | HEART RATE: 64 BPM

## 2024-03-28 DIAGNOSIS — Q18.1 CYST OF EAR CANAL: ICD-10-CM

## 2024-03-28 DIAGNOSIS — H61.23 IMPACTED CERUMEN OF BOTH EARS: Primary | ICD-10-CM

## 2024-03-28 DIAGNOSIS — H90.0 CONDUCTIVE HEARING LOSS OF BOTH EARS: ICD-10-CM

## 2024-03-28 NOTE — PROGRESS NOTES
CHIEF COMPLAINT:  Chief Complaint   Patient presents with    Cerumen Impaction    Hearing Loss       HISTORY:    Rose stated that the hearing is decreased in both ears, which are plugged up with ear wax.      EXAMINATION:    Both external ear canals were occluded by cerumen impaction, obscuring visualization of the TMs.        PROCEDURE - REMOVAL OF BILATERAL CERUMEN IMPACTION:   The cerumen impaction was removed from both of the EACs, under otomicroscopy visualization, with instrumentation, using a Billeau wire loop.  After successful cerumen removal, there was a white bb sized cyst in the right EAC at about 8 o'clock.  Otherwise, the EACs appeared to be normal and clear bilaterally without mass, exudate, or edema.  The tympanic membranes appeared to be normal, including normal pneumatic mobility bilaterally.  There was no evidence of acute disease.  Rose reported improved hearing, back to usual level, after cerumen removal.  \"Oh my goodness, a lot better\"        IMPRESSION / DIAGNOSES / ORDERS / PROCEDURES:       Rose was seen today for cerumen impaction and hearing loss.    Diagnoses and all orders for this visit:    Impacted cerumen of both ears    Conductive hearing loss of both ears    Cyst of ear canal        RECOMMENDATIONS / PLAN:   Cyst removal right ear canal, excision or marsupialization, in office. Local anesthesia.    See Patient Instructions on file for this visit.  Return for recurrence of symptoms of excessive ear wax, or if decide for excision of cyst in right ear canal.

## 2024-03-28 NOTE — PATIENT INSTRUCTIONS
Schedule an appointment for ear recheck and possible cleaning in the future if your hearing is decreased, or you have a sensation of ear wax build up or are told you have ear wax build up by your primary physician or other health care provider.    You may use an over the counter ear wax removal kit (such as Murine, Bausch and Lomb, NeilMed, or Debrox wax removal system) for ear wax removal, as needed.  It may help to use Debrox (OTC) for 4 days prior to future visits for ear cleaning.  This may soften your ear wax and facilitate removal of the wax.        NO Q-TIPS OR OTHER INSTRUMENTS/OBJECTS IN THE EARS   You should never clean your ears with a Q-tip, cotton tipped applicator, Lizeth pin, paper clip, safety pin, pen cap, or any other instrument.  This will tend to push wax in deeper and pack the ear canal with wax.  There is a high risk and danger of this practice, especially rupture of ear drum, dislocation or other damage to ossicles, and permanent, irreversible, and irreparable hearing loss.  It may cause inflammation and irritation of the ear canal and cause itching or pain.  I recommend only use of one the several ear wax removal kits available \"over the counter\" if you feel a need to try to remove ear wax.  For example, Murine, Bausch and Lomb, NeilMed, or Debrox ear wax removal kits may be used for ear wax removal, as needed.  No other methods should be self used for cleaning your ears.

## 2024-03-29 ENCOUNTER — TELEPHONE (OUTPATIENT)
Dept: ENT CLINIC | Age: 84
End: 2024-03-29

## 2024-03-29 NOTE — TELEPHONE ENCOUNTER
Dr. Jean Baptiste - your note does state she is to have a cyst removal of the ear canal. Will this be in-office? And did you want her to come in on Tuesday? Please advise. Thank you.

## 2024-03-29 NOTE — TELEPHONE ENCOUNTER
See if she can come in next Tuesday at 2 pm for the excision in the office, local anesthesia, room 9.

## 2024-03-29 NOTE — TELEPHONE ENCOUNTER
Thanks.  I discussed this with the patient and Sunitha yesterday.  I advised both that I would check my schedule and get back to the patient as to when it can be done.  I will let you know.

## 2024-03-29 NOTE — TELEPHONE ENCOUNTER
She wants to know if there will be follow up visit after -- she would like to schedule that also due to transportation. Please advise.

## 2024-03-29 NOTE — TELEPHONE ENCOUNTER
Pt called in stating  told her someone would call her with an appt time and date so he can remove a cyst she has. She said he told her next tuesday? But I told her he isn't in office on Tuesday's and I would send over a msg to get clarification.

## 2024-04-02 ENCOUNTER — TELEPHONE (OUTPATIENT)
Dept: ENT CLINIC | Age: 84
End: 2024-04-02

## 2024-04-02 NOTE — TELEPHONE ENCOUNTER
Per Rachel , patient stated that she will call the office to schedule the excision after she returns from [place] trip.

## 2024-04-02 NOTE — TELEPHONE ENCOUNTER
Rose called to let Dr. Jean Baptiste know that she did not stop her Eliquis prior to her procedure this afternoon and she has a cough.

## 2024-04-03 ENCOUNTER — PROCEDURE VISIT (OUTPATIENT)
Dept: AUDIOLOGY | Age: 84
End: 2024-04-03
Payer: MEDICARE

## 2024-04-03 DIAGNOSIS — H90.3 SENSORINEURAL HEARING LOSS, BILATERAL: Primary | ICD-10-CM

## 2024-04-03 PROCEDURE — 92567 TYMPANOMETRY: CPT

## 2024-04-03 PROCEDURE — 92557 COMPREHENSIVE HEARING TEST: CPT

## 2024-04-03 NOTE — PATIENT INSTRUCTIONS
to make a new question.  For example, if you heard the word \"Thursday\" but not the rest of the week, you may ask \"What was that about Thursday?\" or \"What did you want to do Thursday?\".  This shows the person talking that you are listening and will help them better explain what they are saying.  Be an advocate for yourself.  If you are hearing but not understanding, tell the other person \"I can hear you, but I need you to slow down when you speak.\"  Or if someone is facing the other direction, say \"I cannot hear you when you are not looking at me when we talk.\"       Tips as a Talker:   - Sit or stand 3 to 6 feet away to maximize audibility         -- It is unrealistic to believe someone else will fully hear your message if you are speaking from across the room or in a different room in the house   - Stay at eye level to help with visual cues   - Make sure you have the person’s attention before speaking   - Use facial expressions and gestures to accentuate your message   - Raise your voice slightly (do not scream)   - Speak slowly and distinctly   - Use short, simple sentences   - Rephrase your words if the person is having a hard time understanding you    - To avoid distortion, don’t speak directly into a person’s ear      Some additional items that may be helpful:   - Remain patient - this is important for both parties   - Write down items that still cannot be heard/understood.  You may write with pen/paper or consider typing/texting on a cell phone or smart device.   - If background noise is unavoidable, try to keep yourself in a good position in the room.  By sitting at a schroeder on the side of the restaurant (preferably a corner), it will be easier to communicate than if you were sitting at a table in the middle with background noise surrounding you.  Keep yourself positioned away from music speakers or heavy foot traffic.

## 2024-04-03 NOTE — PROGRESS NOTES
Rose Arriola   1940, 83 y.o. female   [id number]       Referring Provider: Avila Jean Baptiste MD  Referral Type: In an order in Epic    Reason for Visit: Evaluation of suspected change in hearing, tinnitus, or balance.    ADULT AUDIOLOGIC EVALUATION      Rose Arriola is a 83 y.o. female seen today, 4/3/2024 , for a recheck audiologic evaluation.      AUDIOLOGIC AND OTHER PERTINENT MEDICAL HISTORY:      Rose Arriola reports decrease in hearing bilaterally since last audiogram on 8/1/23 and would like her hearing to be re-checked prior to pursuing hearing aids. She says she has been trying to get hearing aids for the past two years. She would like to get devices through [email or web address] the next time she is [place], but she could not due to a cyst present in the right ear. Patient was scheduled to have that removed by Dr. Jean Baptiste, but had to reschedule due to taking medication day of. Patient states she will be returning back to [place] in a couple of weeks and staying for a month and would like to obtain hearing aids during that time.     She denied otalgia, aural fullness, otorrhea, tinnitus, dizziness, history of noise exposure, history of head trauma, history of ear surgery, and family history of hearing loss    Date: 4/3/2024     IMPRESSIONS:      Today's results revealed sensorineural hearing loss bilaterally. Thresholds have remained stable in comparison to previous audiogram. Good speech understanding when in quiet in the right, fair in the left. Tympanometry indicates normal middle ear function. Discussed test results and implications with patient. Discussed scheduling a HAE. Hearing aids recommended at this time. Follow up with Dr. Jean Baptiste regarding cyst removal prior to fitting hearing aids.   Follow medical recommendations of Avila Jean Baptiste MD.     ASSESSMENT AND FINDINGS:     Otoscopy unremarkable.    RIGHT EAR:  Hearing Sensitivity: Normal to moderate sensorineural hearing loss. Threshold at 250Hz improved 15dB

## 2024-04-19 ENCOUNTER — TELEPHONE (OUTPATIENT)
Dept: ENT CLINIC | Age: 84
End: 2024-04-19

## 2024-04-19 NOTE — TELEPHONE ENCOUNTER
Pt would like to reschedule appt for excision of cyst in ear canal and wants to know if  can squeeze her in sometime next week? She said she is going to [place] and wants to see him before she goes.

## 2024-04-19 NOTE — TELEPHONE ENCOUNTER
Called pt, no answer,  full   Congestion is common in newborns, it may have just been that the \"booger\" was dried out/stuck to the tender mucus membrane in his nose, causing a little bleeding. If he has no active nasal bleeding, and no active circumcision bleeding, and is otherwise acting well, eating normally, continue to monitor at home. Call if bleeding recurs, any new/concerning symptoms, or of course go to ED if any fever.

## 2024-10-27 ENCOUNTER — TELEPHONE (OUTPATIENT)
Dept: ENT CLINIC | Age: 84
End: 2024-10-27

## 2024-10-27 NOTE — TELEPHONE ENCOUNTER
Please call patient to schedule follow-up appointments as possible for recheck of the cyst in her ear and also recheck a mass on her mandible.

## 2024-12-16 ENCOUNTER — OFFICE VISIT (OUTPATIENT)
Dept: ENT CLINIC | Age: 84
End: 2024-12-16
Payer: MEDICARE

## 2024-12-16 VITALS
SYSTOLIC BLOOD PRESSURE: 123 MMHG | BODY MASS INDEX: 28.66 KG/M2 | HEIGHT: 65 IN | WEIGHT: 172 LBS | OXYGEN SATURATION: 97 % | HEART RATE: 57 BPM | DIASTOLIC BLOOD PRESSURE: 64 MMHG | TEMPERATURE: 97.2 F

## 2024-12-16 DIAGNOSIS — H90.0 CONDUCTIVE HEARING LOSS OF BOTH EARS: ICD-10-CM

## 2024-12-16 DIAGNOSIS — H61.23 IMPACTED CERUMEN OF BOTH EARS: Primary | ICD-10-CM

## 2024-12-16 PROCEDURE — 69210 REMOVE IMPACTED EAR WAX UNI: CPT | Performed by: OTOLARYNGOLOGY

## 2024-12-16 NOTE — PATIENT INSTRUCTIONS
Schedule an audiogram (hearing test), if your hearing is not back to your usual ability, or if hearing loss or tinnitus (ringing or other noise) persists, despite removal of ear wax,.  Schedule an appointment for ear recheck and possible cleaning in the future if your hearing is decreased, or you have a sensation of ear wax build up or are told you have ear wax build up by your primary physician or other health care provider.    You may use an over the counter ear wax removal kit (such as Murine, Bausch and Lomb, NeilMed, or Debrox wax removal system) for ear wax removal, as needed.  It may help to use Debrox (OTC) for 4 days prior to future visits for ear cleaning.  This may soften your ear wax and facilitate removal of the wax.        NO Q-TIPS OR OTHER INSTRUMENTS/OBJECTS IN THE EARS   You should never clean your ears with a Q-tip, cotton tipped applicator, Lizeth pin, paper clip, safety pin, pen cap, or any other instrument.  This will tend to push wax in deeper and pack the ear canal with wax.  There is a high risk and danger of this practice, especially rupture of ear drum, dislocation or other damage to ossicles, and permanent, irreversible, and irreparable hearing loss.  It may cause inflammation and irritation of the ear canal and cause itching or pain.  I recommend only use of one the several ear wax removal kits available \"over the counter\" if you feel a need to try to remove ear wax.  For example, Murine, Bausch and Lomb, NeilMed, or Debrox ear wax removal kits may be used for ear wax removal, as needed.  No other methods should be self used for cleaning your ears.

## 2024-12-16 NOTE — PROGRESS NOTES
CHIEF COMPLAINT:  Chief Complaint   Patient presents with    Cerumen Impaction    Hearing Loss       HISTORY:    Rose stated that the hearing is decreased in both ears, which are plugged up with ear wax.      EXAMINATION:    Both external ear canals were occluded by cerumen impaction, and hearing aids are not working well, obscuring visualization of the TMs.        PROCEDURE - REMOVAL OF BILATERAL CERUMEN IMPACTION (CPT 78042):   The cerumen impaction was removed from both of the EACs, under otomicroscopic visualization, with instrumentation, using a Billeau wire loop.  After successful cerumen removal, the EACs appeared to be normal and clear bilaterally without mass, exudate, or edema.  The tympanic membranes appeared to be normal, including normal pneumatic mobility bilaterally.  There was no evidence of acute disease.  Rose reported improved hearing, back to usual level, after cerumen removal, and replacement of hearing aids.          IMPRESSION / DIAGNOSES / ORDERS / PROCEDURES:       Rose was seen today for cerumen impaction and hearing loss.    Diagnoses and all orders for this visit:    Impacted cerumen of both ears    Conductive hearing loss of both ears          RECOMMENDATIONS / PLAN:   See Patient Instructions on file for this visit.  Return for recurrence of symptoms of excessive ear wax, or any other ear, nose, throat, or sinus problems.

## 2025-06-19 ENCOUNTER — OFFICE VISIT (OUTPATIENT)
Dept: ENT CLINIC | Age: 85
End: 2025-06-19
Payer: MEDICARE

## 2025-06-19 VITALS
SYSTOLIC BLOOD PRESSURE: 114 MMHG | HEART RATE: 54 BPM | HEIGHT: 66 IN | TEMPERATURE: 97.8 F | BODY MASS INDEX: 28.38 KG/M2 | WEIGHT: 176.6 LBS | DIASTOLIC BLOOD PRESSURE: 65 MMHG

## 2025-06-19 DIAGNOSIS — H61.23 BILATERAL IMPACTED CERUMEN: Primary | ICD-10-CM

## 2025-06-19 PROCEDURE — 69210 REMOVE IMPACTED EAR WAX UNI: CPT | Performed by: OTOLARYNGOLOGY

## 2025-06-19 NOTE — PROGRESS NOTES
Patient here for cerumen impaction. Saw Chait as needed,     PE: Bilateral cerumen impaction.     Cerumen  Pre operative diagnosis: Cerumen impaction bilaterally  Post operative diagnosis: Same  Procedure: bilaterally cerumenectomy    After consent an operating microscope was utilized to visualize the external auditory canals bilaterally.  Cerumen was removed with curettes and helton suctions on the both sides.  The tympanic membrane is intact.  No fluid visualized in the middle ear. No complications.  I attest I performed the entire procedure myself.     A/P: Follow up as needed.